# Patient Record
Sex: FEMALE | Race: WHITE | URBAN - METROPOLITAN AREA
[De-identification: names, ages, dates, MRNs, and addresses within clinical notes are randomized per-mention and may not be internally consistent; named-entity substitution may affect disease eponyms.]

---

## 2017-10-30 ENCOUNTER — APPOINTMENT (OUTPATIENT)
Dept: URBAN - METROPOLITAN AREA SURGERY 9 | Age: 82
Setting detail: DERMATOLOGY
End: 2017-10-30

## 2017-10-30 DIAGNOSIS — L72.0 EPIDERMAL CYST: ICD-10-CM

## 2017-10-30 PROBLEM — E13.9 OTHER SPECIFIED DIABETES MELLITUS WITHOUT COMPLICATIONS: Status: ACTIVE | Noted: 2017-10-30

## 2017-10-30 PROBLEM — M12.9 ARTHROPATHY, UNSPECIFIED: Status: ACTIVE | Noted: 2017-10-30

## 2017-10-30 PROBLEM — I10 ESSENTIAL (PRIMARY) HYPERTENSION: Status: ACTIVE | Noted: 2017-10-30

## 2017-10-30 PROBLEM — E78.5 HYPERLIPIDEMIA, UNSPECIFIED: Status: ACTIVE | Noted: 2017-10-30

## 2017-10-30 PROBLEM — D48.5 NEOPLASM OF UNCERTAIN BEHAVIOR OF SKIN: Status: ACTIVE | Noted: 2017-10-30

## 2017-10-30 PROCEDURE — 99202 OFFICE O/P NEW SF 15 MIN: CPT

## 2017-10-30 PROCEDURE — OTHER COUNSELING: OTHER

## 2017-10-30 PROCEDURE — OTHER CONSULTATION EXCISION: OTHER

## 2017-10-30 PROCEDURE — OTHER OTHER: OTHER

## 2017-10-30 ASSESSMENT — LOCATION DETAILED DESCRIPTION DERM: LOCATION DETAILED: RIGHT MEDIAL INFERIOR EYELID

## 2017-10-30 ASSESSMENT — LOCATION ZONE DERM: LOCATION ZONE: EYELID

## 2017-10-30 ASSESSMENT — LOCATION SIMPLE DESCRIPTION DERM: LOCATION SIMPLE: RIGHT INFERIOR EYELID

## 2017-10-30 NOTE — HPI: SKIN LESION
Has Your Skin Lesion Been Treated?: not been treated
Is This A New Presentation, Or A Follow-Up?: Skin Lesion
Additional History: Patient states it's a cyst.

## 2017-10-30 NOTE — PROCEDURE: OTHER
Other (Free Text): Recommended patient see [facility] () for evaluation and treatment. Patient prefers to see the [facility] surgeon first to see if they can remove it before going to another office for removal. Will discuss with the [facility] surgeons prior to scheduling excision appt. Recommend a  the day of the procedure since surgery will be so close to the eye Other (Free Text): Recommended patient see [facility] () for evaluation and treatment. Patient prefers to see the [facility] surgeon first to see if they can remove it before going to another office for removal. Will discuss with the [facility] surgeons prior to scheduling excision appt. Recommend a  the day of the procedure since surgery will be so close to the eye

## 2018-01-09 ENCOUNTER — APPOINTMENT (OUTPATIENT)
Dept: URBAN - METROPOLITAN AREA SURGERY 9 | Age: 83
Setting detail: DERMATOLOGY
End: 2018-01-11

## 2018-01-09 DIAGNOSIS — L72.8 OTHER FOLLICULAR CYSTS OF THE SKIN AND SUBCUTANEOUS TISSUE: ICD-10-CM

## 2018-01-09 PROCEDURE — OTHER CONSULTATION EXCISION: OTHER

## 2018-01-09 PROCEDURE — 99212 OFFICE O/P EST SF 10 MIN: CPT

## 2020-08-24 ENCOUNTER — APPOINTMENT (OUTPATIENT)
Dept: URBAN - METROPOLITAN AREA SURGERY 9 | Age: 85
Setting detail: DERMATOLOGY
End: 2020-08-25

## 2020-08-24 DIAGNOSIS — L72.8 OTHER FOLLICULAR CYSTS OF THE SKIN AND SUBCUTANEOUS TISSUE: ICD-10-CM

## 2020-08-24 PROCEDURE — 99213 OFFICE O/P EST LOW 20 MIN: CPT

## 2020-08-24 PROCEDURE — OTHER CONSULTATION EXCISION: OTHER

## 2020-09-23 ENCOUNTER — APPOINTMENT (OUTPATIENT)
Dept: URBAN - METROPOLITAN AREA SURGERY 9 | Age: 85
Setting detail: DERMATOLOGY
End: 2020-09-24

## 2020-09-23 DIAGNOSIS — L72.8 OTHER FOLLICULAR CYSTS OF THE SKIN AND SUBCUTANEOUS TISSUE: ICD-10-CM

## 2020-09-23 PROCEDURE — 12052 INTMD RPR FACE/MM 2.6-5.0 CM: CPT

## 2020-09-23 PROCEDURE — 11443 EXC FACE-MM B9+MARG 2.1-3 CM: CPT

## 2020-09-23 PROCEDURE — OTHER EXCISION: OTHER

## 2020-09-23 ASSESSMENT — LOCATION SIMPLE DESCRIPTION DERM: LOCATION SIMPLE: RIGHT CHEEK

## 2020-09-23 ASSESSMENT — LOCATION DETAILED DESCRIPTION DERM: LOCATION DETAILED: RIGHT SUPERIOR CENTRAL MALAR CHEEK

## 2020-09-23 ASSESSMENT — LOCATION ZONE DERM: LOCATION ZONE: FACE

## 2020-09-23 NOTE — PROCEDURE: EXCISION
Present, unchanged Complex Repair Preamble Text (Leave Blank If You Do Not Want): Extensive wide undermining was performed.

## 2020-11-09 ENCOUNTER — APPOINTMENT (OUTPATIENT)
Dept: URBAN - METROPOLITAN AREA SURGERY 9 | Age: 85
Setting detail: DERMATOLOGY
End: 2020-11-11

## 2020-11-09 DIAGNOSIS — Z48.817 ENCOUNTER FOR SURGICAL AFTERCARE FOLLOWING SURGERY ON THE SKIN AND SUBCUTANEOUS TISSUE: ICD-10-CM

## 2020-11-09 PROCEDURE — OTHER POST-OP WOUND CHECK: OTHER

## 2020-11-09 PROCEDURE — 99024 POSTOP FOLLOW-UP VISIT: CPT

## 2020-11-09 ASSESSMENT — LOCATION SIMPLE DESCRIPTION DERM: LOCATION SIMPLE: RIGHT CHEEK

## 2020-11-09 ASSESSMENT — LOCATION DETAILED DESCRIPTION DERM: LOCATION DETAILED: RIGHT CENTRAL MALAR CHEEK

## 2020-11-09 ASSESSMENT — LOCATION ZONE DERM: LOCATION ZONE: FACE

## 2020-11-09 NOTE — PROCEDURE: POST-OP WOUND CHECK
Body Location Override (Optional - Billing Will Still Be Based On Selected Body Map Location If Applicable): right lower eyelid
Detail Level: Detailed
Add 27083 Cpt? (Important Note: In 2017 The Use Of 40228 Is Being Tracked By Cms To Determine Future Global Period Reimbursement For Global Periods): yes
Wound Evaluated By: Dr. Lu

## 2023-07-21 LAB
GRAM STAIN: NORMAL
TISSUE/WOUND CULTURE/SMEAR: NORMAL

## 2024-02-02 ENCOUNTER — APPOINTMENT (OUTPATIENT)
Dept: CARDIOLOGY | Facility: HOSPITAL | Age: 89
DRG: 641 | End: 2024-02-02
Payer: MEDICARE

## 2024-02-02 ENCOUNTER — HOSPITAL ENCOUNTER (INPATIENT)
Facility: HOSPITAL | Age: 89
LOS: 1 days | Discharge: NURSING, REHAB OR LONG-TERM CARE | DRG: 641 | End: 2024-02-03
Attending: EMERGENCY MEDICINE | Admitting: INTERNAL MEDICINE
Payer: MEDICARE

## 2024-02-02 ENCOUNTER — APPOINTMENT (OUTPATIENT)
Dept: RADIOLOGY | Facility: HOSPITAL | Age: 89
DRG: 641 | End: 2024-02-02
Payer: MEDICARE

## 2024-02-02 DIAGNOSIS — I50.9 CONGESTIVE HEART FAILURE, UNSPECIFIED HF CHRONICITY, UNSPECIFIED HEART FAILURE TYPE (MULTI): ICD-10-CM

## 2024-02-02 DIAGNOSIS — L03.119 CELLULITIS OF LOWER EXTREMITY, UNSPECIFIED LATERALITY: ICD-10-CM

## 2024-02-02 DIAGNOSIS — R60.0 LEG EDEMA: Primary | ICD-10-CM

## 2024-02-02 DIAGNOSIS — R22.2 LOCALIZED SWELLING, MASS AND LUMP, TRUNK: ICD-10-CM

## 2024-02-02 DIAGNOSIS — R19.7 DIARRHEA, UNSPECIFIED TYPE: ICD-10-CM

## 2024-02-02 LAB
ALBUMIN SERPL BCP-MCNC: 3.8 G/DL (ref 3.4–5)
ALP SERPL-CCNC: 93 U/L (ref 33–136)
ALT SERPL W P-5'-P-CCNC: 10 U/L (ref 7–45)
ANION GAP SERPL CALC-SCNC: 12 MMOL/L (ref 10–20)
APPEARANCE UR: CLEAR
AST SERPL W P-5'-P-CCNC: 12 U/L (ref 9–39)
BACTERIA #/AREA URNS AUTO: ABNORMAL /HPF
BASOPHILS # BLD AUTO: 0.07 X10*3/UL (ref 0–0.1)
BASOPHILS NFR BLD AUTO: 0.7 %
BILIRUB SERPL-MCNC: 0.3 MG/DL (ref 0–1.2)
BILIRUB UR STRIP.AUTO-MCNC: NEGATIVE MG/DL
BNP SERPL-MCNC: 179 PG/ML (ref 0–99)
BUN SERPL-MCNC: 7 MG/DL (ref 6–23)
C DIF TOX TCDA+TCDB STL QL NAA+PROBE: NOT DETECTED
CALCIUM SERPL-MCNC: 9.1 MG/DL (ref 8.6–10.3)
CARDIAC TROPONIN I PNL SERPL HS: 4 NG/L (ref 0–13)
CHLORIDE SERPL-SCNC: 103 MMOL/L (ref 98–107)
CO2 SERPL-SCNC: 28 MMOL/L (ref 21–32)
COLOR UR: YELLOW
CREAT SERPL-MCNC: 0.37 MG/DL (ref 0.5–1.05)
EGFRCR SERPLBLD CKD-EPI 2021: >90 ML/MIN/1.73M*2
EOSINOPHIL # BLD AUTO: 0.07 X10*3/UL (ref 0–0.4)
EOSINOPHIL NFR BLD AUTO: 0.7 %
ERYTHROCYTE [DISTWIDTH] IN BLOOD BY AUTOMATED COUNT: 18.2 % (ref 11.5–14.5)
GLUCOSE SERPL-MCNC: 159 MG/DL (ref 74–99)
GLUCOSE UR STRIP.AUTO-MCNC: NEGATIVE MG/DL
HCT VFR BLD AUTO: 36.2 % (ref 36–46)
HGB BLD-MCNC: 10.9 G/DL (ref 12–16)
IMM GRANULOCYTES # BLD AUTO: 0.05 X10*3/UL (ref 0–0.5)
IMM GRANULOCYTES NFR BLD AUTO: 0.5 % (ref 0–0.9)
KETONES UR STRIP.AUTO-MCNC: NEGATIVE MG/DL
LEUKOCYTE ESTERASE UR QL STRIP.AUTO: ABNORMAL
LYMPHOCYTES # BLD AUTO: 1.37 X10*3/UL (ref 0.8–3)
LYMPHOCYTES NFR BLD AUTO: 14.6 %
MCH RBC QN AUTO: 24.2 PG (ref 26–34)
MCHC RBC AUTO-ENTMCNC: 30.1 G/DL (ref 32–36)
MCV RBC AUTO: 80 FL (ref 80–100)
MONOCYTES # BLD AUTO: 0.87 X10*3/UL (ref 0.05–0.8)
MONOCYTES NFR BLD AUTO: 9.3 %
NEUTROPHILS # BLD AUTO: 6.94 X10*3/UL (ref 1.6–5.5)
NEUTROPHILS NFR BLD AUTO: 74.2 %
NITRITE UR QL STRIP.AUTO: NEGATIVE
NRBC BLD-RTO: 0 /100 WBCS (ref 0–0)
PH UR STRIP.AUTO: 7 [PH]
PLATELET # BLD AUTO: 460 X10*3/UL (ref 150–450)
POTASSIUM SERPL-SCNC: 3.8 MMOL/L (ref 3.5–5.3)
PROT SERPL-MCNC: 6.9 G/DL (ref 6.4–8.2)
PROT UR STRIP.AUTO-MCNC: NEGATIVE MG/DL
RBC # BLD AUTO: 4.51 X10*6/UL (ref 4–5.2)
RBC # UR STRIP.AUTO: NEGATIVE /UL
RBC #/AREA URNS AUTO: ABNORMAL /HPF
SODIUM SERPL-SCNC: 139 MMOL/L (ref 136–145)
SP GR UR STRIP.AUTO: 1.01
UROBILINOGEN UR STRIP.AUTO-MCNC: <2 MG/DL
WBC # BLD AUTO: 9.4 X10*3/UL (ref 4.4–11.3)
WBC #/AREA URNS AUTO: ABNORMAL /HPF

## 2024-02-02 PROCEDURE — 84075 ASSAY ALKALINE PHOSPHATASE: CPT | Performed by: EMERGENCY MEDICINE

## 2024-02-02 PROCEDURE — 1100000001 HC PRIVATE ROOM DAILY

## 2024-02-02 PROCEDURE — 99223 1ST HOSP IP/OBS HIGH 75: CPT | Performed by: INTERNAL MEDICINE

## 2024-02-02 PROCEDURE — 2500000004 HC RX 250 GENERAL PHARMACY W/ HCPCS (ALT 636 FOR OP/ED): Performed by: EMERGENCY MEDICINE

## 2024-02-02 PROCEDURE — 93010 ELECTROCARDIOGRAM REPORT: CPT | Performed by: NURSE PRACTITIONER

## 2024-02-02 PROCEDURE — 81001 URINALYSIS AUTO W/SCOPE: CPT | Performed by: EMERGENCY MEDICINE

## 2024-02-02 PROCEDURE — 87506 IADNA-DNA/RNA PROBE TQ 6-11: CPT | Mod: SAMLAB | Performed by: EMERGENCY MEDICINE

## 2024-02-02 PROCEDURE — 36415 COLL VENOUS BLD VENIPUNCTURE: CPT | Performed by: EMERGENCY MEDICINE

## 2024-02-02 PROCEDURE — 83880 ASSAY OF NATRIURETIC PEPTIDE: CPT | Performed by: EMERGENCY MEDICINE

## 2024-02-02 PROCEDURE — 84484 ASSAY OF TROPONIN QUANT: CPT | Performed by: EMERGENCY MEDICINE

## 2024-02-02 PROCEDURE — 99285 EMERGENCY DEPT VISIT HI MDM: CPT | Performed by: EMERGENCY MEDICINE

## 2024-02-02 PROCEDURE — 87086 URINE CULTURE/COLONY COUNT: CPT | Mod: SAMLAB | Performed by: EMERGENCY MEDICINE

## 2024-02-02 PROCEDURE — 71045 X-RAY EXAM CHEST 1 VIEW: CPT

## 2024-02-02 PROCEDURE — 93005 ELECTROCARDIOGRAM TRACING: CPT

## 2024-02-02 PROCEDURE — 87493 C DIFF AMPLIFIED PROBE: CPT | Performed by: EMERGENCY MEDICINE

## 2024-02-02 PROCEDURE — 84460 ALANINE AMINO (ALT) (SGPT): CPT | Performed by: EMERGENCY MEDICINE

## 2024-02-02 PROCEDURE — 85025 COMPLETE CBC W/AUTO DIFF WBC: CPT | Performed by: EMERGENCY MEDICINE

## 2024-02-02 PROCEDURE — 71045 X-RAY EXAM CHEST 1 VIEW: CPT | Performed by: RADIOLOGY

## 2024-02-02 PROCEDURE — 96365 THER/PROPH/DIAG IV INF INIT: CPT

## 2024-02-02 RX ORDER — ACETAMINOPHEN 325 MG/1
650 TABLET ORAL EVERY 6 HOURS PRN
COMMUNITY

## 2024-02-02 RX ORDER — GABAPENTIN 300 MG/1
300 CAPSULE ORAL EVERY MORNING
COMMUNITY

## 2024-02-02 RX ORDER — METFORMIN HYDROCHLORIDE 500 MG/1
500 TABLET, EXTENDED RELEASE ORAL DAILY
COMMUNITY

## 2024-02-02 RX ORDER — BISMUTH SUBSALICYLATE 262 MG
1 TABLET,CHEWABLE ORAL DAILY
COMMUNITY

## 2024-02-02 RX ORDER — ESCITALOPRAM OXALATE 10 MG/1
10 TABLET ORAL EVERY MORNING
COMMUNITY

## 2024-02-02 RX ORDER — CEFTRIAXONE 2 G/50ML
2 INJECTION, SOLUTION INTRAVENOUS ONCE
Status: COMPLETED | OUTPATIENT
Start: 2024-02-02 | End: 2024-02-02

## 2024-02-02 RX ORDER — PANTOPRAZOLE SODIUM 40 MG/1
40 TABLET, DELAYED RELEASE ORAL DAILY
COMMUNITY

## 2024-02-02 RX ORDER — METOPROLOL TARTRATE 50 MG/1
50 TABLET ORAL 2 TIMES DAILY
COMMUNITY

## 2024-02-02 RX ORDER — GABAPENTIN 300 MG/1
600 CAPSULE ORAL NIGHTLY
COMMUNITY

## 2024-02-02 RX ORDER — CALCIUM CARBONATE 300MG(750)
400 TABLET,CHEWABLE ORAL 2 TIMES DAILY
COMMUNITY
End: 2024-02-29 | Stop reason: HOSPADM

## 2024-02-02 RX ORDER — ATORVASTATIN CALCIUM 20 MG/1
20 TABLET, FILM COATED ORAL NIGHTLY
COMMUNITY

## 2024-02-02 RX ORDER — AMLODIPINE BESYLATE 5 MG/1
5 TABLET ORAL DAILY
COMMUNITY
End: 2024-02-03 | Stop reason: HOSPADM

## 2024-02-02 RX ORDER — WARFARIN SODIUM 5 MG/1
5 TABLET ORAL DAILY
COMMUNITY
End: 2024-02-29 | Stop reason: HOSPADM

## 2024-02-02 RX ORDER — CYCLOBENZAPRINE HCL 10 MG
10 TABLET ORAL DAILY PRN
COMMUNITY

## 2024-02-02 RX ORDER — ACETAMINOPHEN 325 MG/1
650 TABLET ORAL EVERY 4 HOURS PRN
Status: DISCONTINUED | OUTPATIENT
Start: 2024-02-02 | End: 2024-02-03 | Stop reason: HOSPADM

## 2024-02-02 RX ORDER — MIRTAZAPINE 15 MG/1
15 TABLET, FILM COATED ORAL NIGHTLY
COMMUNITY

## 2024-02-02 RX ORDER — LOPERAMIDE HYDROCHLORIDE 2 MG/1
2 CAPSULE ORAL 4 TIMES DAILY PRN
COMMUNITY

## 2024-02-02 RX ADMIN — ACETAMINOPHEN 650 MG: 325 TABLET ORAL at 23:38

## 2024-02-02 RX ADMIN — CEFTRIAXONE SODIUM 2 G: 2 INJECTION, SOLUTION INTRAVENOUS at 19:15

## 2024-02-02 SDOH — SOCIAL STABILITY: SOCIAL INSECURITY: ABUSE: ADULT

## 2024-02-02 SDOH — SOCIAL STABILITY: SOCIAL INSECURITY: HAS ANYONE EVER THREATENED TO HURT YOUR FAMILY OR YOUR PETS?: NO

## 2024-02-02 SDOH — SOCIAL STABILITY: SOCIAL INSECURITY: HAVE YOU HAD THOUGHTS OF HARMING ANYONE ELSE?: NO

## 2024-02-02 SDOH — SOCIAL STABILITY: SOCIAL INSECURITY: DO YOU FEEL UNSAFE GOING BACK TO THE PLACE WHERE YOU ARE LIVING?: NO

## 2024-02-02 SDOH — SOCIAL STABILITY: SOCIAL INSECURITY: DO YOU FEEL ANYONE HAS EXPLOITED OR TAKEN ADVANTAGE OF YOU FINANCIALLY OR OF YOUR PERSONAL PROPERTY?: YES

## 2024-02-02 SDOH — SOCIAL STABILITY: SOCIAL INSECURITY: ARE YOU OR HAVE YOU BEEN THREATENED OR ABUSED PHYSICALLY, EMOTIONALLY, OR SEXUALLY BY ANYONE?: NO

## 2024-02-02 SDOH — SOCIAL STABILITY: SOCIAL INSECURITY: DOES ANYONE TRY TO KEEP YOU FROM HAVING/CONTACTING OTHER FRIENDS OR DOING THINGS OUTSIDE YOUR HOME?: NO

## 2024-02-02 SDOH — SOCIAL STABILITY: SOCIAL INSECURITY: ARE THERE ANY APPARENT SIGNS OF INJURIES/BEHAVIORS THAT COULD BE RELATED TO ABUSE/NEGLECT?: YES

## 2024-02-02 ASSESSMENT — LIFESTYLE VARIABLES
AUDIT-C TOTAL SCORE: 0
SKIP TO QUESTIONS 9-10: 1
SUBSTANCE_ABUSE_PAST_12_MONTHS: NO
HOW MANY STANDARD DRINKS CONTAINING ALCOHOL DO YOU HAVE ON A TYPICAL DAY: PATIENT DOES NOT DRINK
HOW OFTEN DO YOU HAVE A DRINK CONTAINING ALCOHOL: NEVER
AUDIT-C TOTAL SCORE: 0
PRESCIPTION_ABUSE_PAST_12_MONTHS: NO
HOW OFTEN DO YOU HAVE 6 OR MORE DRINKS ON ONE OCCASION: NEVER

## 2024-02-02 ASSESSMENT — ACTIVITIES OF DAILY LIVING (ADL)
HEARING - LEFT EAR: DIFFICULTY WITH NOISE
JUDGMENT_ADEQUATE_SAFELY_COMPLETE_DAILY_ACTIVITIES: YES
ADEQUATE_TO_COMPLETE_ADL: YES
HEARING - RIGHT EAR: DIFFICULTY WITH NOISE
FEEDING YOURSELF: INDEPENDENT
DRESSING YOURSELF: NEEDS ASSISTANCE
GROOMING: NEEDS ASSISTANCE
TOILETING: NEEDS ASSISTANCE
PATIENT'S MEMORY ADEQUATE TO SAFELY COMPLETE DAILY ACTIVITIES?: YES
BATHING: NEEDS ASSISTANCE
LACK_OF_TRANSPORTATION: NO
WALKS IN HOME: DEPENDENT

## 2024-02-02 ASSESSMENT — PAIN DESCRIPTION - LOCATION: LOCATION: HEAD

## 2024-02-02 ASSESSMENT — PAIN - FUNCTIONAL ASSESSMENT
PAIN_FUNCTIONAL_ASSESSMENT: 0-10

## 2024-02-02 ASSESSMENT — PATIENT HEALTH QUESTIONNAIRE - PHQ9
2. FEELING DOWN, DEPRESSED OR HOPELESS: NOT AT ALL
1. LITTLE INTEREST OR PLEASURE IN DOING THINGS: NOT AT ALL
SUM OF ALL RESPONSES TO PHQ9 QUESTIONS 1 & 2: 0

## 2024-02-02 ASSESSMENT — COLUMBIA-SUICIDE SEVERITY RATING SCALE - C-SSRS
6. HAVE YOU EVER DONE ANYTHING, STARTED TO DO ANYTHING, OR PREPARED TO DO ANYTHING TO END YOUR LIFE?: NO
2. HAVE YOU ACTUALLY HAD ANY THOUGHTS OF KILLING YOURSELF?: NO
1. IN THE PAST MONTH, HAVE YOU WISHED YOU WERE DEAD OR WISHED YOU COULD GO TO SLEEP AND NOT WAKE UP?: NO

## 2024-02-02 ASSESSMENT — PAIN SCALES - GENERAL
PAINLEVEL_OUTOF10: 0 - NO PAIN
PAINLEVEL_OUTOF10: 9
PAINLEVEL_OUTOF10: 0 - NO PAIN

## 2024-02-02 ASSESSMENT — COGNITIVE AND FUNCTIONAL STATUS - GENERAL: PATIENT BASELINE BEDBOUND: YES

## 2024-02-02 NOTE — ED PROVIDER NOTES
HPI   Chief Complaint   Patient presents with    Leg Swelling     Patient arrives to the ER with complaint of LE edema bilaterally, seepage of serous fluid from the lower legs, patient has 3-4 + pitting edema. Further patient has watery diarrhea no going on 2 weeks and Imodium is not stopping the diarrhea. Pain in LE when she stands and is not able to walk,       Limitations to History: None    HPI: 88-year-old female presents with concern for diarrhea for the past 3 weeks.  States it has been loose in nature.  States that she also has swelling to her bilateral lower extremities.  Denies any chest pain, shortness of breath, nausea, vomiting, fever, chills, urinary symptoms.    Additional History Obtained from: EMS    ------------------------------------------------------------------------------------------------------------------------------------------  Physical Exam:    VS: As documented in the triage note and EMR flowsheet from this visit were reviewed.    Appearance: Alert. cooperative,  in no acute distress.   Skin: Erythematous bilateral lower extremities.  Violaceous purpura.   HENT: Normocephalic, atraumatic. Nares patent. No intraoral lesions.   Neck: Supple, without meningismus. Trachea at midline. No lymphadenopathy.  Pulmonary: Clear bilaterally with good chest wall excursion. No rales, rhonchi or wheezing. No accessory muscle use or stridor.  Cardiac: Regular rate and rhythm, no rubs, murmurs, or gallops.   Abdomen: Abdomen is soft, nontender, and nondistended.  No palpable organomegaly.  No rebound or guarding.  No CVA tenderness. Nonsurgical abdomen  Genitourinary: Exam deferred.  Musculoskeletal: Full range of motion.  Pulses full and equal. No cyanosis, clubbing, or edema.  Psychiatric: Appropriate mood and affect.                            No data recorded                Patient History   No past medical history on file.  No past surgical history on file.  No family history on file.  Social  History     Tobacco Use    Smoking status: Not on file    Smokeless tobacco: Not on file   Substance Use Topics    Alcohol use: Not on file    Drug use: Not on file       Physical Exam   ED Triage Vitals   Temp Pulse Resp BP   -- -- -- --      SpO2 Temp src Heart Rate Source Patient Position   -- -- -- --      BP Location FiO2 (%)     -- --       Physical Exam    ED Course & MDM   Diagnoses as of 02/02/24 1844   Leg edema   Diarrhea, unspecified type   Cellulitis of lower extremity, unspecified laterality       Medical Decision Making  Labs Reviewed  CBC WITH AUTO DIFFERENTIAL - Abnormal     WBC                           9.4                    nRBC                          0.0                    RBC                           4.51                   Hemoglobin                    10.9 (*)               Hematocrit                    36.2                   MCV                           80                     MCH                           24.2 (*)               MCHC                          30.1 (*)               RDW                           18.2 (*)               Platelets                     460 (*)                Neutrophils %                 74.2                   Immature Granulocytes %, Automated   0.5                    Lymphocytes %                 14.6                   Monocytes %                   9.3                    Eosinophils %                 0.7                    Basophils %                   0.7                    Neutrophils Absolute          6.94 (*)               Immature Granulocytes Absolute, Au*   0.05                   Lymphocytes Absolute          1.37                   Monocytes Absolute            0.87 (*)               Eosinophils Absolute          0.07                   Basophils Absolute            0.07                COMPREHENSIVE METABOLIC PANEL - Abnormal     Glucose                       159 (*)                Sodium                        139                    Potassium                      3.8                    Chloride                      103                    Bicarbonate                   28                     Anion Gap                     12                     Urea Nitrogen                 7                      Creatinine                    0.37 (*)               eGFR                          >90                    Calcium                       9.1                    Albumin                       3.8                    Alkaline Phosphatase          93                     Total Protein                 6.9                    AST                           12                     Bilirubin, Total              0.3                    ALT                           10                  B-TYPE NATRIURETIC PEPTIDE - Abnormal     BNP                           179 (*)                    Narrative:    <100 pg/mL - Heart failure unlikely                  100-299 pg/mL - Intermediate probability of acute heart                                  failure exacerbation. Correlate with clinical                                  context and patient history.                    >=300 pg/mL - Heart Failure likely. Correlate with clinical                                  context and patient history.                                    BNP testing is performed using different testing methodology at [facility] than at other [facility]. Direct result comparisons should only be made within the same method.                     C. DIFFICILE, PCR - Normal     C. difficile, PCR                                        Narrative: This test is an FDA-cleared real-time PCR assay for detection of toxigenic C. difficile DNA from unprocessed liquid or unformed stool specimens that have not undergone nucleic acid extraction in symptomatic patients with potential C. difficile infection (CDI). A positive result may indicate colonization, and clinical assessment is required for the diagnosis of CDI. This test  cannot be performed on formed stools or used as a test of cure, and should not be performed more than once per 7 days.  TROPONIN I, HIGH SENSITIVITY - Normal     Troponin I, High Sensitivity   4                          Narrative: Less than 99th percentile of normal range cutoff-                  Female and children under 18 years old <14 ng/L; Male <21 ng/L: Negative                  Repeat testing should be performed if clinically indicated.                                     Female and children under 18 years old 14-50 ng/L; Male 21-50 ng/L:                  Consistent with possible cardiac damage and possible increased clinical                   risk. Serial measurements may help to assess extent of myocardial damage.                                     >50 ng/L: Consistent with cardiac damage, increased clinical risk and                  myocardial infarction. Serial measurements may help assess extent of                   myocardial damage.                                      NOTE: Children less than 1 year old may have higher baseline troponin                   levels and results should be interpreted in conjunction with the overall                   clinical context.                                     NOTE: Troponin I testing is performed using a different                   testing methodology at [facility] than at other                   [facility]. Direct result comparisons should only                   be made within the same method.  STOOL PATHOGEN PANEL, PCR  URINALYSIS WITH REFLEX CULTURE AND MICROSCOPIC         Narrative: The following orders were created for panel order Urinalysis with Reflex Culture and Microscopic.                  Procedure                               Abnormality         Status                                     ---------                               -----------         ------                                     Urinalysis with Reflex C...[[id number]]                                                                  Extra Urine Gray Tube[[id number]]                                                                                         Please view results for these tests on the individual orders.  URINALYSIS WITH REFLEX CULTURE AND MICROSCOPIC  EXTRA URINE GRAY TUBE  XR chest 1 view   Final Result    1.  Mild cardiomegaly. No acute pulmonary process          Signed by: Reggie Freedman 2/2/2024 5:49 PM    Dictation workstation:   [id number]     Medical Decision Making:    Patient appears well nontoxic.  Vital signs show hypertension but otherwise within normal limits.  Lab work unremarkable.  C. difficile negative.  Stool sent for culture.  Patient's lower extremities show edema well as well as extensive cellulitis.  Patient treated with Rocephin.  Will be admitted for further treatment and evaluation.    Differential Diagnoses Considered: Cellulitis, edema, CHF, electrolyte abnormality, infectious diarrhea    Independent Interpretation of Studies:  I independently interpreted: Chest x-ray shows no evidence of pneumothorax or pneumonia.    Escalation of Care:  Appropriate for admission for further treatment and evaluation.    Discussion of Management with Other Providers:   I discussed the patient/results with: Admitting hospitalist.            Procedure  ECG 12 lead    Performed by: Gigi Núñez DO  Authorized by: Gigi Núñez DO    ECG interpreted by ED Physician in the absence of a cardiologist: yes    Comments:      EKG interpreted by Dr. Gigi Núñez: Normal sinus rhythm at 61 bpm.  DC interval 174 ms.  QTc 442 ms.  No evidence of ST elevation or depression at this time.       Gigi Núñez DO  02/02/24 1941

## 2024-02-03 VITALS
WEIGHT: 166.67 LBS | TEMPERATURE: 97.3 F | SYSTOLIC BLOOD PRESSURE: 170 MMHG | BODY MASS INDEX: 26.79 KG/M2 | OXYGEN SATURATION: 90 % | HEART RATE: 65 BPM | RESPIRATION RATE: 20 BRPM | DIASTOLIC BLOOD PRESSURE: 75 MMHG | HEIGHT: 66 IN

## 2024-02-03 LAB
ANION GAP SERPL CALC-SCNC: 14 MMOL/L (ref 10–20)
BUN SERPL-MCNC: 5 MG/DL (ref 6–23)
C COLI+JEJ+UPSA DNA STL QL NAA+PROBE: NOT DETECTED
CALCIUM SERPL-MCNC: 8.8 MG/DL (ref 8.6–10.3)
CHLORIDE SERPL-SCNC: 100 MMOL/L (ref 98–107)
CO2 SERPL-SCNC: 29 MMOL/L (ref 21–32)
CREAT SERPL-MCNC: 0.35 MG/DL (ref 0.5–1.05)
EC STX1 GENE STL QL NAA+PROBE: NOT DETECTED
EC STX2 GENE STL QL NAA+PROBE: NOT DETECTED
EGFRCR SERPLBLD CKD-EPI 2021: >90 ML/MIN/1.73M*2
ERYTHROCYTE [DISTWIDTH] IN BLOOD BY AUTOMATED COUNT: 18 % (ref 11.5–14.5)
FERRITIN SERPL-MCNC: 23 NG/ML (ref 8–150)
FOLATE SERPL-MCNC: 16.6 NG/ML
GLUCOSE BLD MANUAL STRIP-MCNC: 113 MG/DL (ref 74–99)
GLUCOSE SERPL-MCNC: 119 MG/DL (ref 74–99)
HCT VFR BLD AUTO: 34.9 % (ref 36–46)
HGB BLD-MCNC: 10.2 G/DL (ref 12–16)
HGB RETIC QN: 26 PG (ref 28–38)
HOLD SPECIMEN: NORMAL
IMMATURE RETIC FRACTION: 31.3 %
INR PPP: 3.7 (ref 0.9–1.1)
IRON SATN MFR SERPL: 7 % (ref 25–45)
IRON SERPL-MCNC: 16 UG/DL (ref 35–150)
MCH RBC QN AUTO: 23.6 PG (ref 26–34)
MCHC RBC AUTO-ENTMCNC: 29.2 G/DL (ref 32–36)
MCV RBC AUTO: 81 FL (ref 80–100)
NOROVIRUS GI + GII RNA STL NAA+PROBE: NOT DETECTED
NRBC BLD-RTO: 0 /100 WBCS (ref 0–0)
PLATELET # BLD AUTO: 434 X10*3/UL (ref 150–450)
POTASSIUM SERPL-SCNC: 3.6 MMOL/L (ref 3.5–5.3)
PROTHROMBIN TIME: 42.2 SECONDS (ref 9.8–12.8)
RBC # BLD AUTO: 4.32 X10*6/UL (ref 4–5.2)
RETICS #: 0.06 X10*6/UL (ref 0.02–0.11)
RETICS/RBC NFR AUTO: 1.4 % (ref 0.5–2)
RV RNA STL NAA+PROBE: NOT DETECTED
SALMONELLA DNA STL QL NAA+PROBE: NOT DETECTED
SHIGELLA DNA SPEC QL NAA+PROBE: NOT DETECTED
SODIUM SERPL-SCNC: 139 MMOL/L (ref 136–145)
TIBC SERPL-MCNC: 236 UG/DL (ref 240–445)
TSH SERPL-ACNC: 2 MIU/L (ref 0.44–3.98)
UIBC SERPL-MCNC: 220 UG/DL (ref 110–370)
V CHOLERAE DNA STL QL NAA+PROBE: NOT DETECTED
VIT B12 SERPL-MCNC: 300 PG/ML (ref 211–911)
WBC # BLD AUTO: 7.7 X10*3/UL (ref 4.4–11.3)
Y ENTEROCOL DNA STL QL NAA+PROBE: NOT DETECTED

## 2024-02-03 PROCEDURE — 99238 HOSP IP/OBS DSCHRG MGMT 30/<: CPT | Performed by: INTERNAL MEDICINE

## 2024-02-03 PROCEDURE — 87070 CULTURE OTHR SPECIMN AEROBIC: CPT | Mod: SAMLAB | Performed by: INTERNAL MEDICINE

## 2024-02-03 PROCEDURE — 84443 ASSAY THYROID STIM HORMONE: CPT | Performed by: INTERNAL MEDICINE

## 2024-02-03 PROCEDURE — 82947 ASSAY GLUCOSE BLOOD QUANT: CPT

## 2024-02-03 PROCEDURE — 85045 AUTOMATED RETICULOCYTE COUNT: CPT | Performed by: INTERNAL MEDICINE

## 2024-02-03 PROCEDURE — 83540 ASSAY OF IRON: CPT | Performed by: INTERNAL MEDICINE

## 2024-02-03 PROCEDURE — 82746 ASSAY OF FOLIC ACID SERUM: CPT | Performed by: INTERNAL MEDICINE

## 2024-02-03 PROCEDURE — 2500000004 HC RX 250 GENERAL PHARMACY W/ HCPCS (ALT 636 FOR OP/ED): Performed by: INTERNAL MEDICINE

## 2024-02-03 PROCEDURE — 82607 VITAMIN B-12: CPT | Performed by: INTERNAL MEDICINE

## 2024-02-03 PROCEDURE — 85610 PROTHROMBIN TIME: CPT | Performed by: INTERNAL MEDICINE

## 2024-02-03 PROCEDURE — 36415 COLL VENOUS BLD VENIPUNCTURE: CPT | Performed by: INTERNAL MEDICINE

## 2024-02-03 PROCEDURE — 82728 ASSAY OF FERRITIN: CPT | Performed by: INTERNAL MEDICINE

## 2024-02-03 PROCEDURE — 80048 BASIC METABOLIC PNL TOTAL CA: CPT | Performed by: INTERNAL MEDICINE

## 2024-02-03 PROCEDURE — 85027 COMPLETE CBC AUTOMATED: CPT | Performed by: INTERNAL MEDICINE

## 2024-02-03 PROCEDURE — 2500000001 HC RX 250 WO HCPCS SELF ADMINISTERED DRUGS (ALT 637 FOR MEDICARE OP): Performed by: INTERNAL MEDICINE

## 2024-02-03 RX ORDER — ONDANSETRON HYDROCHLORIDE 2 MG/ML
4 INJECTION, SOLUTION INTRAVENOUS EVERY 8 HOURS PRN
Status: DISCONTINUED | OUTPATIENT
Start: 2024-02-03 | End: 2024-02-03 | Stop reason: HOSPADM

## 2024-02-03 RX ORDER — GABAPENTIN 300 MG/1
300 CAPSULE ORAL EVERY MORNING
Status: DISCONTINUED | OUTPATIENT
Start: 2024-02-03 | End: 2024-02-03 | Stop reason: HOSPADM

## 2024-02-03 RX ORDER — MIRTAZAPINE 15 MG/1
15 TABLET, FILM COATED ORAL NIGHTLY
Status: DISCONTINUED | OUTPATIENT
Start: 2024-02-03 | End: 2024-02-03 | Stop reason: HOSPADM

## 2024-02-03 RX ORDER — PANTOPRAZOLE SODIUM 40 MG/1
40 TABLET, DELAYED RELEASE ORAL DAILY
Status: DISCONTINUED | OUTPATIENT
Start: 2024-02-03 | End: 2024-02-03 | Stop reason: HOSPADM

## 2024-02-03 RX ORDER — ONDANSETRON 4 MG/1
4 TABLET, ORALLY DISINTEGRATING ORAL EVERY 8 HOURS PRN
Status: DISCONTINUED | OUTPATIENT
Start: 2024-02-03 | End: 2024-02-03 | Stop reason: HOSPADM

## 2024-02-03 RX ORDER — MAGNESIUM HYDROXIDE 2400 MG/10ML
10 SUSPENSION ORAL DAILY PRN
Status: DISCONTINUED | OUTPATIENT
Start: 2024-02-03 | End: 2024-02-03 | Stop reason: HOSPADM

## 2024-02-03 RX ORDER — DEXTROSE 50 % IN WATER (D50W) INTRAVENOUS SYRINGE
25
Status: DISCONTINUED | OUTPATIENT
Start: 2024-02-03 | End: 2024-02-03 | Stop reason: HOSPADM

## 2024-02-03 RX ORDER — CEFTRIAXONE 1 G/50ML
1 INJECTION, SOLUTION INTRAVENOUS EVERY 24 HOURS
Status: DISCONTINUED | OUTPATIENT
Start: 2024-02-03 | End: 2024-02-03 | Stop reason: HOSPADM

## 2024-02-03 RX ORDER — ENOXAPARIN SODIUM 100 MG/ML
40 INJECTION SUBCUTANEOUS EVERY 24 HOURS
Status: DISCONTINUED | OUTPATIENT
Start: 2024-02-03 | End: 2024-02-03

## 2024-02-03 RX ORDER — DEXTROSE MONOHYDRATE 100 MG/ML
0.3 INJECTION, SOLUTION INTRAVENOUS ONCE AS NEEDED
Status: DISCONTINUED | OUTPATIENT
Start: 2024-02-03 | End: 2024-02-03 | Stop reason: HOSPADM

## 2024-02-03 RX ORDER — METOPROLOL TARTRATE 50 MG/1
50 TABLET ORAL 2 TIMES DAILY
Status: DISCONTINUED | OUTPATIENT
Start: 2024-02-03 | End: 2024-02-03 | Stop reason: HOSPADM

## 2024-02-03 RX ORDER — AMLODIPINE BESYLATE 5 MG/1
5 TABLET ORAL DAILY
Status: DISCONTINUED | OUTPATIENT
Start: 2024-02-03 | End: 2024-02-03 | Stop reason: HOSPADM

## 2024-02-03 RX ORDER — ACETAMINOPHEN 160 MG/5ML
650 SOLUTION ORAL EVERY 4 HOURS PRN
Status: DISCONTINUED | OUTPATIENT
Start: 2024-02-03 | End: 2024-02-03 | Stop reason: HOSPADM

## 2024-02-03 RX ORDER — ESCITALOPRAM OXALATE 10 MG/1
10 TABLET ORAL EVERY MORNING
Status: DISCONTINUED | OUTPATIENT
Start: 2024-02-03 | End: 2024-02-03 | Stop reason: HOSPADM

## 2024-02-03 RX ORDER — ACETAMINOPHEN 325 MG/1
650 TABLET ORAL EVERY 4 HOURS PRN
Status: DISCONTINUED | OUTPATIENT
Start: 2024-02-03 | End: 2024-02-03 | Stop reason: HOSPADM

## 2024-02-03 RX ORDER — WARFARIN SODIUM 5 MG/1
5 TABLET ORAL DAILY
Status: DISCONTINUED | OUTPATIENT
Start: 2024-02-03 | End: 2024-02-03 | Stop reason: HOSPADM

## 2024-02-03 RX ORDER — ACETAMINOPHEN 650 MG/1
650 SUPPOSITORY RECTAL EVERY 4 HOURS PRN
Status: DISCONTINUED | OUTPATIENT
Start: 2024-02-03 | End: 2024-02-03 | Stop reason: HOSPADM

## 2024-02-03 RX ORDER — FUROSEMIDE 40 MG/1
40 TABLET ORAL DAILY
Qty: 30 TABLET | Refills: 0 | Status: SHIPPED | OUTPATIENT
Start: 2024-02-03 | End: 2024-03-04

## 2024-02-03 RX ORDER — ATORVASTATIN CALCIUM 20 MG/1
20 TABLET, FILM COATED ORAL NIGHTLY
Status: DISCONTINUED | OUTPATIENT
Start: 2024-02-03 | End: 2024-02-03 | Stop reason: HOSPADM

## 2024-02-03 RX ORDER — GABAPENTIN 300 MG/1
600 CAPSULE ORAL NIGHTLY
Status: DISCONTINUED | OUTPATIENT
Start: 2024-02-03 | End: 2024-02-03 | Stop reason: HOSPADM

## 2024-02-03 RX ORDER — FUROSEMIDE 10 MG/ML
20 INJECTION INTRAMUSCULAR; INTRAVENOUS EVERY 8 HOURS SCHEDULED
Status: DISCONTINUED | OUTPATIENT
Start: 2024-02-03 | End: 2024-02-03 | Stop reason: HOSPADM

## 2024-02-03 RX ADMIN — GABAPENTIN 600 MG: 300 CAPSULE ORAL at 01:56

## 2024-02-03 RX ADMIN — AMLODIPINE BESYLATE 5 MG: 5 TABLET ORAL at 08:43

## 2024-02-03 RX ADMIN — ESCITALOPRAM OXALATE 10 MG: 10 TABLET ORAL at 08:43

## 2024-02-03 RX ADMIN — FUROSEMIDE 20 MG: 10 INJECTION, SOLUTION INTRAMUSCULAR; INTRAVENOUS at 05:08

## 2024-02-03 RX ADMIN — METOPROLOL TARTRATE 50 MG: 50 TABLET, FILM COATED ORAL at 10:10

## 2024-02-03 RX ADMIN — MIRTAZAPINE 15 MG: 15 TABLET, FILM COATED ORAL at 01:56

## 2024-02-03 RX ADMIN — METOPROLOL TARTRATE 50 MG: 50 TABLET, FILM COATED ORAL at 01:56

## 2024-02-03 RX ADMIN — PANTOPRAZOLE SODIUM 40 MG: 40 TABLET, DELAYED RELEASE ORAL at 08:43

## 2024-02-03 RX ADMIN — GABAPENTIN 300 MG: 300 CAPSULE ORAL at 10:09

## 2024-02-03 RX ADMIN — ATORVASTATIN CALCIUM 20 MG: 20 TABLET, FILM COATED ORAL at 01:56

## 2024-02-03 ASSESSMENT — COGNITIVE AND FUNCTIONAL STATUS - GENERAL
STANDING UP FROM CHAIR USING ARMS: A LITTLE
TOILETING: A LITTLE
HELP NEEDED FOR BATHING: A LITTLE
DAILY ACTIVITIY SCORE: 22
MOVING TO AND FROM BED TO CHAIR: A LITTLE
CLIMB 3 TO 5 STEPS WITH RAILING: A LOT
MOBILITY SCORE: 18
WALKING IN HOSPITAL ROOM: A LOT

## 2024-02-03 ASSESSMENT — PAIN SCALES - GENERAL
PAINLEVEL_OUTOF10: 0 - NO PAIN
PAINLEVEL_OUTOF10: 0 - NO PAIN

## 2024-02-03 ASSESSMENT — PAIN - FUNCTIONAL ASSESSMENT: PAIN_FUNCTIONAL_ASSESSMENT: 0-10

## 2024-02-03 NOTE — PROGRESS NOTES
02/03/24 0900   Discharge Planning   Living Arrangements Other (Comment)   Support Systems Other (Comment)   Assistance Needed return to [facility]   Type of Residence Assisted living   Do you have animals or pets at home? No   Care Facility Name [facility]   Who is requesting discharge planning? Provider       had asked if patient could return to [facility] today. SW did call [facility] and they are able to accept her back.LALO met with patient and introduced self and explained the role of CT in discharge planning.  Explained that we are looking for her to return to the [facility] and she was in agreement. LALO also called patients TANI Hemphill and advised her that patient will be returning to the [facility] today. She was in agreement and asked for the nurse to call her with update. Did ask nurse to do so. Patient will return to [facility] today. Transportation to be set up by unit secretary.

## 2024-02-03 NOTE — DISCHARGE SUMMARY
Discharge Diagnosis  Peripheral edema    Issues Requiring Follow-Up  Continued monitoring with her peripheral edema including addition of Lasix and also Ace wraps to legs bilaterally  Needs to have an echo set up as an outpatient      Test Results Pending At Discharge  Pending Labs       Order Current Status    Stool Pathogen Panel, PCR In process    Tissue/Wound Culture/Smear In process    Urine Culture In process            Hospital Course   She presented to the emergency room due to new onset of peripheral edema with volume overload  She was diuresed and her legs were wrapped and her legs are looking much better already  She would like to go back home she currently resides at the [facility] here in [place].  I will send her home Lasix  She should continue Ace wraps to legs bilaterally  She should also have close follow-up with a primary care physician and an echo as an outpatient  She states that she can get all of this done as an outpatient and really does not want to stay here in the hospital  At this time she is stable for discharge    Pertinent Physical Exam At Time of Discharge  Physical Exam  Constitutional:       Appearance: Normal appearance.   HENT:      Head: Normocephalic and atraumatic.      Right Ear: External ear normal.      Left Ear: External ear normal.      Nose: Nose normal.      Mouth/Throat:      Mouth: Mucous membranes are moist.      Pharynx: Oropharynx is clear.   Eyes:      Extraocular Movements: Extraocular movements intact.      Conjunctiva/sclera: Conjunctivae normal.      Pupils: Pupils are equal, round, and reactive to light.   Cardiovascular:      Rate and Rhythm: Normal rate and regular rhythm.   Pulmonary:      Effort: Pulmonary effort is normal.      Breath sounds: Normal breath sounds.   Abdominal:      General: Abdomen is flat.      Palpations: Abdomen is soft.   Musculoskeletal:         General: Swelling present.      Right lower leg: Edema present.      Left lower leg: Edema  present.      Comments: Ace wraps to her knees bilaterally and swelling is much improved  She did have some skin breakdown from the swelling and this will need to be watched as an outpatient as well   Skin:     General: Skin is warm and dry.   Neurological:      General: No focal deficit present.      Mental Status: She is alert and oriented to person, place, and time.   Psychiatric:         Mood and Affect: Mood normal.         Behavior: Behavior normal.         Outpatient Follow-Up  No future appointments.    Yair Benavidez, DO

## 2024-02-03 NOTE — H&P
History Of Present Illness  Mayte Pulliam is a 88 y.o. female  Who presented to the emergency room for lower extremities edema with seepage as well as diarrhea.  On presentation, vital signs grossly within normal limits.  Pertinent findings on blood workup; hemoglobin 10.9, glucose 159 and .  Urinalysis showed some trace leukocyte esterase.  Chest x-ray showed a mild cardiomegaly.  Patient was given in the emergency room 2 g IV ceftriaxone and then admitted to the medical service for further investigation and management.  Patient intubated history to around 2 weeks ago when she started noticing progressively worsening edema in the lower extremities.  This was not associated with any shortness of breath or palpitations or chest pain.  Never had this before.  Did not have any fever or chills.  The edema continued to worsen and the past several days she started having blisters with skin breaks.  Also the legs became tender to touch.  So patient came to the emergency room.     ROS  10 systems were reviewed and were negative except for those noted in the history of present illness.    Past Medical History  Past Medical History:   Diagnosis Date    Anxiety     COPD (chronic obstructive pulmonary disease) (CMS/HCC)     COVID-19     Depression     Depression     Diabetes mellitus (CMS/HCC)     GERD (gastroesophageal reflux disease)     Hyperlipidemia     Hypertension     Hypomagnesemia     Malnutrition (CMS/HCC)     Osteoarthritis      Pertinent medical history also documented in my below narrative    Surgical History  History reviewed. No pertinent surgical history.   Pertinent surgical history also documented in my below narrative    Social History  She reports that she has never smoked. She has never used smokeless tobacco. She reports that she does not currently use alcohol. She reports that she does not use drugs.    Family History  No family history on file.     Allergies  Patient has no known  allergies.    Medications Prior to Admission   Medication Sig Dispense Refill Last Dose    acetaminophen (Tylenol) 325 mg tablet Take 2 tablets (650 mg) by mouth every 6 hours if needed for mild pain (1 - 3).   2/1/2024    amLODIPine (Norvasc) 5 mg tablet Take 1 tablet (5 mg) by mouth once daily.   2/2/2024 at 0700    atorvastatin (Lipitor) 20 mg tablet Take 1 tablet (20 mg) by mouth once daily at bedtime.   2/1/2024 at 2000    cyclobenzaprine (Flexeril) 10 mg tablet Take 1 tablet (10 mg) by mouth once daily as needed for muscle spasms.   Unknown    escitalopram (Lexapro) 10 mg tablet Take 1 tablet (10 mg) by mouth once daily in the morning.   2/2/2024 at 0700    gabapentin (Neurontin) 300 mg capsule Take 1 capsule (300 mg) by mouth once daily in the morning.   2/2/2024 at 0700    gabapentin (Neurontin) 300 mg capsule Take 2 capsules (600 mg) by mouth once daily at bedtime.   2/1/2024 at 2000    loperamide (Imodium) 2 mg capsule Take 1 capsule (2 mg) by mouth 4 times a day as needed for diarrhea.   2/2/2024    magnesium oxide (Mag-Ox) 400 mg tablet 1 tablet (400 mg) 2 times a day.   2/2/2024 at 0700    metFORMIN  mg 24 hr tablet Take 1 tablet (500 mg) by mouth once daily. Do not crush, chew, or split.   2/2/2024 at 0700    metoprolol tartrate (Lopressor) 50 mg tablet Take 1 tablet by mouth 2 times a day.   2/2/2024 at 0700    mirtazapine (Remeron) 15 mg tablet Take 1 tablet (15 mg) by mouth once daily at bedtime.   2/1/2024 at 2000    multivitamin tablet Take 1 tablet by mouth once daily.   2/2/2024 at 0700    pantoprazole (ProtoNix) 40 mg EC tablet Take 1 tablet (40 mg) by mouth once daily. Do not crush, chew, or split.   2/2/2024 at 0700    warfarin (Coumadin) 5 mg tablet Take 1 tablet (5 mg) by mouth once daily. Take as directed per After Visit Summary.   2/1/2024 at 1700        Last Recorded Vitals  Blood pressure 136/71, pulse 67, temperature 36.2 °C (97.2 °F), temperature source Temporal, resp. rate 16,  "height 1.664 m (5' 5.5\"), weight 75.6 kg (166 lb 10.7 oz), SpO2 94 %.    Physical Exam  Constitutional:       General: She is not in acute distress.     Appearance: She is not ill-appearing.      Comments: Awake alert and oriented x3   HENT:      Ears:      Comments: Hearing loss     Mouth/Throat:      Pharynx: Oropharynx is clear.   Eyes:      Pupils: Pupils are equal, round, and reactive to light.   Cardiovascular:      Rate and Rhythm: Normal rate and regular rhythm.      Comments: There is a grade 3 systolic ejection murmur along the right second intercostal and the apical area  Pulmonary:      Effort: No respiratory distress.      Breath sounds: Normal breath sounds. No wheezing or rhonchi.   Abdominal:      General: Abdomen is flat. Bowel sounds are normal. There is no distension.      Palpations: Abdomen is soft.      Tenderness: There is no abdominal tenderness.   Musculoskeletal:      Comments: There is a severe +4 pitting edema in both legs.  I did not appreciate any in the feet.  There are multiple blisters spread along the legs with chronic venous stasis changes.  Skin breakage noted.  With yellow exudate discharge   Neurological:      General: No focal deficit present.   Psychiatric:         Mood and Affect: Mood normal.         Behavior: Behavior normal.         Thought Content: Thought content normal.         Judgment: Judgment normal.      Comments: Looks very anxious           Relevant Results  Results for orders placed or performed during the hospital encounter of 02/02/24 (from the past 24 hour(s))   CBC and Auto Differential   Result Value Ref Range    WBC 9.4 4.4 - 11.3 x10*3/uL    nRBC 0.0 0.0 - 0.0 /100 WBCs    RBC 4.51 4.00 - 5.20 x10*6/uL    Hemoglobin 10.9 (L) 12.0 - 16.0 g/dL    Hematocrit 36.2 36.0 - 46.0 %    MCV 80 80 - 100 fL    MCH 24.2 (L) 26.0 - 34.0 pg    MCHC 30.1 (L) 32.0 - 36.0 g/dL    RDW 18.2 (H) 11.5 - 14.5 %    Platelets 460 (H) 150 - 450 x10*3/uL    Neutrophils % 74.2 40.0 " - 80.0 %    Immature Granulocytes %, Automated 0.5 0.0 - 0.9 %    Lymphocytes % 14.6 13.0 - 44.0 %    Monocytes % 9.3 2.0 - 10.0 %    Eosinophils % 0.7 0.0 - 6.0 %    Basophils % 0.7 0.0 - 2.0 %    Neutrophils Absolute 6.94 (H) 1.60 - 5.50 x10*3/uL    Immature Granulocytes Absolute, Automated 0.05 0.00 - 0.50 x10*3/uL    Lymphocytes Absolute 1.37 0.80 - 3.00 x10*3/uL    Monocytes Absolute 0.87 (H) 0.05 - 0.80 x10*3/uL    Eosinophils Absolute 0.07 0.00 - 0.40 x10*3/uL    Basophils Absolute 0.07 0.00 - 0.10 x10*3/uL   Comprehensive metabolic panel   Result Value Ref Range    Glucose 159 (H) 74 - 99 mg/dL    Sodium 139 136 - 145 mmol/L    Potassium 3.8 3.5 - 5.3 mmol/L    Chloride 103 98 - 107 mmol/L    Bicarbonate 28 21 - 32 mmol/L    Anion Gap 12 10 - 20 mmol/L    Urea Nitrogen 7 6 - 23 mg/dL    Creatinine 0.37 (L) 0.50 - 1.05 mg/dL    eGFR >90 >60 mL/min/1.73m*2    Calcium 9.1 8.6 - 10.3 mg/dL    Albumin 3.8 3.4 - 5.0 g/dL    Alkaline Phosphatase 93 33 - 136 U/L    Total Protein 6.9 6.4 - 8.2 g/dL    AST 12 9 - 39 U/L    Bilirubin, Total 0.3 0.0 - 1.2 mg/dL    ALT 10 7 - 45 U/L   B-Type Natriuretic Peptide   Result Value Ref Range     (H) 0 - 99 pg/mL   Troponin I, High Sensitivity   Result Value Ref Range    Troponin I, High Sensitivity 4 0 - 13 ng/L   C. difficile, PCR    Specimen: Stool   Result Value Ref Range    C. difficile, PCR Not Detected Not Detected   Urinalysis with Reflex Culture and Microscopic   Result Value Ref Range    Color, Urine Yellow Straw, Yellow    Appearance, Urine Clear Clear    Specific Gravity, Urine 1.008 1.005 - 1.035    pH, Urine 7.0 5.0, 5.5, 6.0, 6.5, 7.0, 7.5, 8.0    Protein, Urine NEGATIVE NEGATIVE mg/dL    Glucose, Urine NEGATIVE NEGATIVE mg/dL    Blood, Urine NEGATIVE NEGATIVE    Ketones, Urine NEGATIVE NEGATIVE mg/dL    Bilirubin, Urine NEGATIVE NEGATIVE    Urobilinogen, Urine <2.0 <2.0 mg/dL    Nitrite, Urine NEGATIVE NEGATIVE    Leukocyte Esterase, Urine TRACE (A)  NEGATIVE   Microscopic Only, Urine   Result Value Ref Range    WBC, Urine 11-20 (A) 1-5, NONE /HPF    RBC, Urine NONE NONE, 1-2, 3-5 /HPF    Bacteria, Urine 1+ (A) NONE SEEN /HPF        XR chest 1 view    Result Date: 2/2/2024  Interpreted By:  Reggie Freedman, STUDY: XR CHEST 1 VIEW;  2/2/2024 5:24 pm   INDICATION: Signs/Symptoms:leg edema.   COMPARISON: None.   ACCESSION NUMBER(S): [id number]   ORDERING CLINICIAN: MATIAS ENRIQUEZ   FINDINGS:   The cardiac silhouette is mildly enlarged. Costophrenic angles are sharp. Lungs are clear. The trachea is midline. There is no pneumothorax. No acute osseous abnormality is seen. There are bilateral broad-based subacromial spur.       1.  Mild cardiomegaly. No acute pulmonary process   Signed by: Reggie Freedman 2/2/2024 5:49 PM Dictation workstation:   [id number]        Assessment/Plan       88-year-old female with a past medical history of diabetes mellitus, hypertension, COPD, ESBL UTI with bacteremia, atrial fibrillation, generalized anxiety disorder, GERD, hyperlipidemia, and chronic arthritic pain who presented to the emergency room for worsening leg edema with seepage in addition to diarrhea.  Pertinent findings on blood workup; anemia, slightly elevated BNP.  Urinalysis showed some small leukocyte esterase.    Leg edema-admit patient to the inpatient medical service with vital signs monitoring.  The etiology of the edema is still unclear.  I will order echocardiogram and TSH levels.  Patient is on amlodipine 5 mg daily.  But this remains a diagnosis of exclusion.  No need for antibiotics for now but I will order culture of the drainage.    Anemia-I will order anemia panel, FOBT, and reticulocyte count.  Close hemoglobin monitoring.    Possible UTI-continue with ceftriaxone 1 g IV daily until the urine culture final report is back    Diarrhea-etiology still unclear.  Stool C. difficile came back negative.  Stool pathogen sent.  I would hold on giving any further  Imodium until stool pathogen final result is back.    Atrial fibrillation-rate controlled.  Continue with the current metoprolol dose.  And the current Coumadin with daily INR checks.    Continue home medications except for the metformin.  Low-dose insulin sliding scale.    Lovenox for DVT prophylaxis  Full code    (This note was generated with voice recognition software and may contain errors including spelling, grammar, syntax and misrecognition of what was dictated, that are not fully corrected)     Marc Reid MD

## 2024-02-05 LAB
BACTERIA SPEC CULT: NORMAL
BACTERIA UR CULT: ABNORMAL
GRAM STN SPEC: NORMAL
GRAM STN SPEC: NORMAL

## 2024-02-06 LAB
ATRIAL RATE: 61 BPM
P AXIS: 71 DEGREES
P OFFSET: 171 MS
P ONSET: 137 MS
PR INTERVAL: 174 MS
Q ONSET: 224 MS
QRS COUNT: 10 BEATS
QRS DURATION: 78 MS
QT INTERVAL: 440 MS
QTC CALCULATION(BAZETT): 442 MS
QTC FREDERICIA: 442 MS
R AXIS: 20 DEGREES
T AXIS: 41 DEGREES
T OFFSET: 444 MS
VENTRICULAR RATE: 61 BPM

## 2024-02-07 DIAGNOSIS — I50.20 SYSTOLIC HEART FAILURE, UNSPECIFIED HF CHRONICITY (MULTI): Primary | ICD-10-CM

## 2024-02-07 DIAGNOSIS — I11.0 HYPERTENSIVE HEART DISEASE WITH HEART FAILURE (MULTI): ICD-10-CM

## 2024-02-22 ENCOUNTER — HOSPITAL ENCOUNTER (EMERGENCY)
Facility: HOSPITAL | Age: 89
Discharge: HOME | End: 2024-02-22
Attending: EMERGENCY MEDICINE
Payer: MEDICARE

## 2024-02-22 ENCOUNTER — APPOINTMENT (OUTPATIENT)
Dept: CARDIOLOGY | Facility: HOSPITAL | Age: 89
End: 2024-02-22
Payer: MEDICARE

## 2024-02-22 ENCOUNTER — APPOINTMENT (OUTPATIENT)
Dept: RADIOLOGY | Facility: HOSPITAL | Age: 89
End: 2024-02-22
Payer: MEDICARE

## 2024-02-22 VITALS
SYSTOLIC BLOOD PRESSURE: 157 MMHG | HEART RATE: 59 BPM | WEIGHT: 160.2 LBS | TEMPERATURE: 97.4 F | BODY MASS INDEX: 26.25 KG/M2 | OXYGEN SATURATION: 94 % | DIASTOLIC BLOOD PRESSURE: 77 MMHG | RESPIRATION RATE: 17 BRPM

## 2024-02-22 DIAGNOSIS — I50.89 OTHER HEART FAILURE (MULTI): ICD-10-CM

## 2024-02-22 DIAGNOSIS — I89.0 LYMPHEDEMA: ICD-10-CM

## 2024-02-22 DIAGNOSIS — R60.0 LOCALIZED EDEMA: Primary | ICD-10-CM

## 2024-02-22 DIAGNOSIS — L03.119 CELLULITIS OF LOWER EXTREMITY, UNSPECIFIED LATERALITY: ICD-10-CM

## 2024-02-22 DIAGNOSIS — R60.9 EDEMA, UNSPECIFIED TYPE: ICD-10-CM

## 2024-02-22 DIAGNOSIS — I50.42 CHRONIC COMBINED SYSTOLIC (CONGESTIVE) AND DIASTOLIC (CONGESTIVE) HEART FAILURE (MULTI): ICD-10-CM

## 2024-02-22 LAB
ALBUMIN SERPL BCP-MCNC: 3.4 G/DL (ref 3.4–5)
ALP SERPL-CCNC: 99 U/L (ref 33–136)
ALT SERPL W P-5'-P-CCNC: 10 U/L (ref 7–45)
ANION GAP SERPL CALC-SCNC: 12 MMOL/L (ref 10–20)
AORTIC VALVE MEAN GRADIENT: 21 MMHG
AORTIC VALVE PEAK VELOCITY: 2.85 M/S
APPEARANCE UR: ABNORMAL
APTT PPP: 72 SECONDS (ref 27–38)
AST SERPL W P-5'-P-CCNC: 12 U/L (ref 9–39)
AV PEAK GRADIENT: 32.5 MMHG
AVA (PEAK VEL): 0.93 CM2
AVA (VTI): 0.85 CM2
BACTERIA #/AREA URNS AUTO: ABNORMAL /HPF
BASOPHILS # BLD AUTO: 0.08 X10*3/UL (ref 0–0.1)
BASOPHILS NFR BLD AUTO: 0.9 %
BILIRUB DIRECT SERPL-MCNC: 0 MG/DL (ref 0–0.3)
BILIRUB SERPL-MCNC: 0.2 MG/DL (ref 0–1.2)
BILIRUB UR STRIP.AUTO-MCNC: NEGATIVE MG/DL
BNP SERPL-MCNC: 134 PG/ML (ref 0–99)
BUN SERPL-MCNC: 9 MG/DL (ref 6–23)
CALCIUM SERPL-MCNC: 8.6 MG/DL (ref 8.6–10.3)
CHLORIDE SERPL-SCNC: 101 MMOL/L (ref 98–107)
CO2 SERPL-SCNC: 31 MMOL/L (ref 21–32)
COLOR UR: YELLOW
CREAT SERPL-MCNC: 0.45 MG/DL (ref 0.5–1.05)
EGFRCR SERPLBLD CKD-EPI 2021: >90 ML/MIN/1.73M*2
EJECTION FRACTION APICAL 4 CHAMBER: 64.9
EJECTION FRACTION: 58 %
EOSINOPHIL # BLD AUTO: 0.22 X10*3/UL (ref 0–0.4)
EOSINOPHIL NFR BLD AUTO: 2.4 %
ERYTHROCYTE [DISTWIDTH] IN BLOOD BY AUTOMATED COUNT: 19 % (ref 11.5–14.5)
GLUCOSE SERPL-MCNC: 149 MG/DL (ref 74–99)
GLUCOSE UR STRIP.AUTO-MCNC: NEGATIVE MG/DL
HCT VFR BLD AUTO: 34.7 % (ref 36–46)
HGB BLD-MCNC: 10.3 G/DL (ref 12–16)
HOLD SPECIMEN: NORMAL
IMM GRANULOCYTES # BLD AUTO: 0.09 X10*3/UL (ref 0–0.5)
IMM GRANULOCYTES NFR BLD AUTO: 1 % (ref 0–0.9)
INR PPP: ABNORMAL
KETONES UR STRIP.AUTO-MCNC: NEGATIVE MG/DL
LACTATE SERPL-SCNC: 2.2 MMOL/L (ref 0.4–2)
LEFT ATRIUM VOLUME AREA LENGTH INDEX BSA: 22.8 ML/M2
LEFT VENTRICLE INTERNAL DIMENSION DIASTOLE: 4.29 CM (ref 3.5–6)
LEFT VENTRICULAR OUTFLOW TRACT DIAMETER: 1.7 CM
LEUKOCYTE ESTERASE UR QL STRIP.AUTO: ABNORMAL
LYMPHOCYTES # BLD AUTO: 1.78 X10*3/UL (ref 0.8–3)
LYMPHOCYTES NFR BLD AUTO: 19.6 %
MAGNESIUM SERPL-MCNC: 1.48 MG/DL (ref 1.6–2.4)
MCH RBC QN AUTO: 23.9 PG (ref 26–34)
MCHC RBC AUTO-ENTMCNC: 29.7 G/DL (ref 32–36)
MCV RBC AUTO: 81 FL (ref 80–100)
MITRAL VALVE E/A RATIO: 0.81
MONOCYTES # BLD AUTO: 0.87 X10*3/UL (ref 0.05–0.8)
MONOCYTES NFR BLD AUTO: 9.6 %
MUCOUS THREADS #/AREA URNS AUTO: ABNORMAL /LPF
NEUTROPHILS # BLD AUTO: 6.05 X10*3/UL (ref 1.6–5.5)
NEUTROPHILS NFR BLD AUTO: 66.5 %
NITRITE UR QL STRIP.AUTO: NEGATIVE
NRBC BLD-RTO: 0 /100 WBCS (ref 0–0)
PH UR STRIP.AUTO: 7 [PH]
PLATELET # BLD AUTO: 447 X10*3/UL (ref 150–450)
POTASSIUM SERPL-SCNC: 3.7 MMOL/L (ref 3.5–5.3)
PROT SERPL-MCNC: 6.3 G/DL (ref 6.4–8.2)
PROT UR STRIP.AUTO-MCNC: NEGATIVE MG/DL
PROTHROMBIN TIME: >100 SECONDS (ref 9.8–12.8)
RBC # BLD AUTO: 4.31 X10*6/UL (ref 4–5.2)
RBC # UR STRIP.AUTO: NEGATIVE /UL
RBC #/AREA URNS AUTO: ABNORMAL /HPF
RIGHT VENTRICLE FREE WALL PEAK S': 16.5 CM/S
RIGHT VENTRICLE PEAK SYSTOLIC PRESSURE: 28.8 MMHG
SODIUM SERPL-SCNC: 140 MMOL/L (ref 136–145)
SP GR UR STRIP.AUTO: 1.01
SQUAMOUS #/AREA URNS AUTO: ABNORMAL /HPF
UROBILINOGEN UR STRIP.AUTO-MCNC: <2 MG/DL
WBC # BLD AUTO: 9.1 X10*3/UL (ref 4.4–11.3)
WBC #/AREA URNS AUTO: >50 /HPF

## 2024-02-22 PROCEDURE — 83880 ASSAY OF NATRIURETIC PEPTIDE: CPT | Performed by: EMERGENCY MEDICINE

## 2024-02-22 PROCEDURE — 2500000004 HC RX 250 GENERAL PHARMACY W/ HCPCS (ALT 636 FOR OP/ED): Performed by: EMERGENCY MEDICINE

## 2024-02-22 PROCEDURE — 83735 ASSAY OF MAGNESIUM: CPT | Performed by: EMERGENCY MEDICINE

## 2024-02-22 PROCEDURE — 96365 THER/PROPH/DIAG IV INF INIT: CPT | Mod: 59

## 2024-02-22 PROCEDURE — 99284 EMERGENCY DEPT VISIT MOD MDM: CPT | Performed by: INTERNAL MEDICINE

## 2024-02-22 PROCEDURE — 83605 ASSAY OF LACTIC ACID: CPT | Performed by: EMERGENCY MEDICINE

## 2024-02-22 PROCEDURE — 81001 URINALYSIS AUTO W/SCOPE: CPT | Performed by: EMERGENCY MEDICINE

## 2024-02-22 PROCEDURE — 93005 ELECTROCARDIOGRAM TRACING: CPT

## 2024-02-22 PROCEDURE — 93306 TTE W/DOPPLER COMPLETE: CPT | Performed by: STUDENT IN AN ORGANIZED HEALTH CARE EDUCATION/TRAINING PROGRAM

## 2024-02-22 PROCEDURE — 87086 URINE CULTURE/COLONY COUNT: CPT | Mod: SAMLAB | Performed by: EMERGENCY MEDICINE

## 2024-02-22 PROCEDURE — 71045 X-RAY EXAM CHEST 1 VIEW: CPT | Mod: FOREIGN READ | Performed by: RADIOLOGY

## 2024-02-22 PROCEDURE — 2500000004 HC RX 250 GENERAL PHARMACY W/ HCPCS (ALT 636 FOR OP/ED): Performed by: INTERNAL MEDICINE

## 2024-02-22 PROCEDURE — 85025 COMPLETE CBC W/AUTO DIFF WBC: CPT | Performed by: EMERGENCY MEDICINE

## 2024-02-22 PROCEDURE — 36415 COLL VENOUS BLD VENIPUNCTURE: CPT | Performed by: EMERGENCY MEDICINE

## 2024-02-22 PROCEDURE — 99285 EMERGENCY DEPT VISIT HI MDM: CPT | Mod: 25

## 2024-02-22 PROCEDURE — 87040 BLOOD CULTURE FOR BACTERIA: CPT | Mod: SAMLAB | Performed by: EMERGENCY MEDICINE

## 2024-02-22 PROCEDURE — 85730 THROMBOPLASTIN TIME PARTIAL: CPT | Performed by: EMERGENCY MEDICINE

## 2024-02-22 PROCEDURE — 71045 X-RAY EXAM CHEST 1 VIEW: CPT

## 2024-02-22 PROCEDURE — 80048 BASIC METABOLIC PNL TOTAL CA: CPT | Performed by: EMERGENCY MEDICINE

## 2024-02-22 PROCEDURE — 96375 TX/PRO/DX INJ NEW DRUG ADDON: CPT | Mod: 59

## 2024-02-22 PROCEDURE — 93306 TTE W/DOPPLER COMPLETE: CPT

## 2024-02-22 PROCEDURE — 84075 ASSAY ALKALINE PHOSPHATASE: CPT | Performed by: EMERGENCY MEDICINE

## 2024-02-22 PROCEDURE — 85610 PROTHROMBIN TIME: CPT | Performed by: EMERGENCY MEDICINE

## 2024-02-22 RX ORDER — CEPHALEXIN 500 MG/1
500 CAPSULE ORAL 4 TIMES DAILY
Qty: 40 CAPSULE | Refills: 0 | Status: SHIPPED | OUTPATIENT
Start: 2024-02-22 | End: 2024-02-27 | Stop reason: WASHOUT

## 2024-02-22 RX ORDER — FUROSEMIDE 10 MG/ML
40 INJECTION INTRAMUSCULAR; INTRAVENOUS ONCE
Status: COMPLETED | OUTPATIENT
Start: 2024-02-22 | End: 2024-02-22

## 2024-02-22 RX ORDER — CEFTRIAXONE 1 G/50ML
1 INJECTION, SOLUTION INTRAVENOUS ONCE
Status: COMPLETED | OUTPATIENT
Start: 2024-02-22 | End: 2024-02-22

## 2024-02-22 RX ORDER — MUPIROCIN 20 MG/G
OINTMENT TOPICAL
Qty: 30 G | Refills: 0 | Status: SHIPPED | OUTPATIENT
Start: 2024-02-22 | End: 2024-02-27 | Stop reason: ALTCHOICE

## 2024-02-22 RX ADMIN — FUROSEMIDE 40 MG: 10 INJECTION, SOLUTION INTRAMUSCULAR; INTRAVENOUS at 14:06

## 2024-02-22 RX ADMIN — CEFTRIAXONE SODIUM 1 G: 1 INJECTION, SOLUTION INTRAVENOUS at 14:07

## 2024-02-22 RX ADMIN — PERFLUTREN 2 ML OF DILUTION: 6.52 INJECTION, SUSPENSION INTRAVENOUS at 14:44

## 2024-02-22 ASSESSMENT — PAIN SCALES - GENERAL: PAINLEVEL_OUTOF10: 0 - NO PAIN

## 2024-02-22 ASSESSMENT — ENCOUNTER SYMPTOMS
DIZZINESS: 0
COUGH: 0
DYSURIA: 0
LIGHT-HEADEDNESS: 0
PALPITATIONS: 0
EYES NEGATIVE: 1
ARTHRALGIAS: 0
BRUISES/BLEEDS EASILY: 1
VOMITING: 0
PSYCHIATRIC NEGATIVE: 1
WHEEZING: 0
MYALGIAS: 1
CHILLS: 0
NECK PAIN: 0
ABDOMINAL PAIN: 0
FEVER: 0
NAUSEA: 0
SHORTNESS OF BREATH: 0

## 2024-02-22 ASSESSMENT — COLUMBIA-SUICIDE SEVERITY RATING SCALE - C-SSRS
1. IN THE PAST MONTH, HAVE YOU WISHED YOU WERE DEAD OR WISHED YOU COULD GO TO SLEEP AND NOT WAKE UP?: NO
2. HAVE YOU ACTUALLY HAD ANY THOUGHTS OF KILLING YOURSELF?: NO

## 2024-02-22 ASSESSMENT — PAIN - FUNCTIONAL ASSESSMENT: PAIN_FUNCTIONAL_ASSESSMENT: 0-10

## 2024-02-22 NOTE — CONSULTS
Reason For Consult  Leg Redness    History Of Present Illness  Mayte Pulliam is a 88 y.o. female presenting with Leg Redness from Assisted Living.  She was recently here with swelling  She is supposed to get an ECHO  She has not had it yet  Has no pain or major complaints  Wants to go home.       Past Medical History  She has a past medical history of Anxiety, COPD (chronic obstructive pulmonary disease) (CMS/[facility]), COVID-19, Depression, Depression, Diabetes mellitus (CMS/[facility]), GERD (gastroesophageal reflux disease), Hyperlipidemia, Hypertension, Hypomagnesemia, Malnutrition (CMS/[facility]), and Osteoarthritis.    Surgical History  She has no past surgical history on file.     Social History  She reports that she has never smoked. She has never used smokeless tobacco. She reports that she does not currently use alcohol. She reports that she does not use drugs.    Family History  No family history on file.     Allergies  Patient has no known allergies.    Review of Systems  A Full 10 Point ROS was obtained and is negative except for the HPI as above     Physical Exam  Physical Exam   Constitutional  She appears well-developed and well-nourished.     Eyes  Pupils are equal, round, and reactive to light. System normal.       Cardiovascular: Normal rate.   Irreular     Pulmonary/Chest  Effort normal and breath sounds normal.     Skin  There is erythema.   [place] Swelling of B/L LE  Red[place], SubQ bruising  No Obvious Infection     Psychiatric  She has a normal mood and affect.              I&O 24HR  No intake or output data in the 24 hours ending 02/22/24 1340    Vitals 24HR  Heart Rate:  [57-60]   Temperature:  [36.3 °C (97.4 °F)]   Respirations:  [16-18]   BP: (157-185)/(70-77)   Weight:  [72.7 kg (160 lb 3.2 oz)]   Pulse Ox:  [93 %-95 %]         Relevant Results  Reviewed     Assessment/Plan       Peripheral Edema with Volume Overload  Possible CHF  Erythema of B/L LE  UTI    Plan:  Give IV Rosephin  Give IV Lasix x1  Send  home on PO Keflex  Follow urine culture  Get ECHO here  See me in office next week with BMP.  Call with any issues    Outpatient:  Keep Legs wrapped daily with off at night  Keep Lasix 40 mg PO daiy for now.  Swelling is much better      Active Problems:  There are no active Hospital Problems.          Yair Benavidez, DO

## 2024-02-22 NOTE — ED PROVIDER NOTES
Chief Complaint: SWOLLEN LEGS    Is an 88-year-old female who was admitted February 2 for peripheral edema she was diuresed and had her legs wrapped she has some renal insufficiency.  She was seen by Dr. Benavidez at that time.  She now presents with return of the edema with some redness of the legs she denies any difficulty breathing no fever or chills otherwise           Review of Systems   Constitutional:  Negative for chills and fever.   HENT: Negative.     Eyes: Negative.    Respiratory:  Negative for cough, shortness of breath and wheezing.    Cardiovascular:  Positive for leg swelling. Negative for chest pain and palpitations.   Gastrointestinal:  Negative for abdominal pain, nausea and vomiting.   Genitourinary:  Negative for dysuria.   Musculoskeletal:  Positive for myalgias. Negative for arthralgias and neck pain.   Skin:  Positive for rash.   Neurological:  Negative for dizziness and light-headedness.   Hematological:  Bruises/bleeds easily.   Psychiatric/Behavioral: Negative.     All other systems reviewed and are negative.       Physical Exam  Constitutional:       General: She is not in acute distress.     Appearance: She is obese. She is not toxic-appearing.   HENT:      Head: Normocephalic and atraumatic.      Nose: Nose normal.      Mouth/Throat:      Mouth: Mucous membranes are moist.      Pharynx: Oropharynx is clear.   Eyes:      Extraocular Movements: Extraocular movements intact.      Conjunctiva/sclera: Conjunctivae normal.      Pupils: Pupils are equal, round, and reactive to light.   Neck:      Vascular: No carotid bruit.   Cardiovascular:      Rate and Rhythm: Normal rate.      Pulses: Normal pulses.      Heart sounds: No murmur heard.  Pulmonary:      Effort: Pulmonary effort is normal. No respiratory distress.   Abdominal:      General: Bowel sounds are normal. There is no distension.      Palpations: There is no mass.   Musculoskeletal:      Cervical back: Normal range of motion and neck  supple.      Comments: Her legs show bilaterally 2-3+ edema there are several blisters that are open she has several ecchymotic areas also at this time she has good pedal pulses however   Skin:     General: Skin is warm and dry.      Capillary Refill: Capillary refill takes less than 2 seconds.      Findings: Erythema and rash present.   Neurological:      General: No focal deficit present.      Mental Status: She is alert and oriented to person, place, and time.   Psychiatric:         Mood and Affect: Mood normal.          Labs Reviewed   CBC WITH AUTO DIFFERENTIAL - Abnormal       Result Value    WBC 9.1      nRBC 0.0      RBC 4.31      Hemoglobin 10.3 (*)     Hematocrit 34.7 (*)     MCV 81      MCH 23.9 (*)     MCHC 29.7 (*)     RDW 19.0 (*)     Platelets 447      Neutrophils % 66.5      Immature Granulocytes %, Automated 1.0 (*)     Lymphocytes % 19.6      Monocytes % 9.6      Eosinophils % 2.4      Basophils % 0.9      Neutrophils Absolute 6.05 (*)     Immature Granulocytes Absolute, Automated 0.09      Lymphocytes Absolute 1.78      Monocytes Absolute 0.87 (*)     Eosinophils Absolute 0.22      Basophils Absolute 0.08     BASIC METABOLIC PANEL - Abnormal    Glucose 149 (*)     Sodium 140      Potassium 3.7      Chloride 101      Bicarbonate 31      Anion Gap 12      Urea Nitrogen 9      Creatinine 0.45 (*)     eGFR >90      Calcium 8.6     MAGNESIUM - Abnormal    Magnesium 1.48 (*)    HEPATIC FUNCTION PANEL - Abnormal    Albumin 3.4      Bilirubin, Total 0.2      Bilirubin, Direct 0.0      Alkaline Phosphatase 99      ALT 10      AST 12      Total Protein 6.3 (*)    PROTIME-INR - Abnormal    Protime >100.0 (*)     INR       APTT - Abnormal    aPTT 72 (*)     Narrative:     The APTT is no longer used for monitoring Unfractionated Heparin Therapy. For monitoring Heparin Therapy, use the Heparin Assay.   B-TYPE NATRIURETIC PEPTIDE - Abnormal     (*)     Narrative:        <100 pg/mL - Heart failure  unlikely  100-299 pg/mL - Intermediate probability of acute heart                  failure exacerbation. Correlate with clinical                  context and patient history.    >=300 pg/mL - Heart Failure likely. Correlate with clinical                  context and patient history.    BNP testing is performed using different testing methodology at [facility] than at other [facility]. Direct result comparisons should only be made within the same method.      LACTATE - Abnormal    Lactate 2.2 (*)     Narrative:     Venipuncture immediately after or during the administration of Metamizole may lead to falsely low results. Testing should be performed immediately  prior to Metamizole dosing.   URINALYSIS WITH REFLEX CULTURE AND MICROSCOPIC - Abnormal    Color, Urine Yellow      Appearance, Urine Hazy (*)     Specific Gravity, Urine 1.008      pH, Urine 7.0      Protein, Urine NEGATIVE      Glucose, Urine NEGATIVE      Blood, Urine NEGATIVE      Ketones, Urine NEGATIVE      Bilirubin, Urine NEGATIVE      Urobilinogen, Urine <2.0      Nitrite, Urine NEGATIVE      Leukocyte Esterase, Urine LARGE (3+) (*)    MICROSCOPIC ONLY, URINE - Abnormal    WBC, Urine >50 (*)     RBC, Urine 11-20 (*)     Squamous Epithelial Cells, Urine 1-9 (SPARSE)      Bacteria, Urine 1+ (*)     Mucus, Urine 1+     BLOOD CULTURE   BLOOD CULTURE   URINE CULTURE   URINALYSIS WITH REFLEX CULTURE AND MICROSCOPIC    Narrative:     The following orders were created for panel order Urinalysis with Reflex Culture and Microscopic.  Procedure                               Abnormality         Status                     ---------                               -----------         ------                     Urinalysis with Reflex C...[[id number]]  Abnormal            Final result               Extra Urine Gray Tube[[id number]]                            In process                   Please view results for these tests on the individual orders.    EXTRA URINE GRAY TUBE        XR chest 1 view   Final Result   No acute process.   Signed by Chi Cabrera MD      Transthoracic Echo (TTE) Complete    (Results Pending)        Procedures     Medical Decision Making  Differential diagnose include CHF pedal edema cellulitis.  Analysis positive for UTI with greater than 50 white cells per high-powered field and 11-20 RBCs.  BMP was normal at 134 lactate was slightly elevated 2.2 her INR was elevated.  Bilirubin was negative metabolic panel was normal count was 9.1 with a normal hemoglobin hematocrit chest x-ray was negative.  Dr. Benavidez her nephrologist was contacted was here and examined the patient states the swelling in her legs actually improved significantly at this time.  An echo was done while here in the emergency department and she received Lasix 40 mg IV as well as Rocephin intravenously.  She will be started on Keflex for the UTI has follow-up with Dr. Benavidez this week as scheduled.  She is to hold her Coumadin until next week to have a repeat INR on Monday.    Amount and/or Complexity of Data Reviewed  ECG/medicine tests: independent interpretation performed.     Details: Twelve-lead EKG showed sinus bradycardia at 57/min but no acute ST segment elevation or depressions at this time         Diagnoses as of 02/22/24 1509   Lymphedema   Cellulitis of lower extremity, unspecified laterality                    Stephen Dias MD  02/22/24 1505

## 2024-02-22 NOTE — DISCHARGE INSTRUCTIONS
KEEP LEGS WRAPPED AND ELEVATED  KEFLEX START TOMORROW FOR INFECTION  BACTROBAN OINT FOR AREAS OF REDNESS  HOLD COUMADIN UNTIL MONDAY NEXT WEEK THEN HAVE REPPEAT INR

## 2024-02-23 LAB — HOLD SPECIMEN: NORMAL

## 2024-02-24 LAB
ATRIAL RATE: 57 BPM
BACTERIA UR CULT: ABNORMAL
BACTERIA UR CULT: ABNORMAL
CARBA5 NEG: ABNORMAL
Lab: ABNORMAL
Lab: DETECTED
P AXIS: 44 DEGREES
P OFFSET: 194 MS
P ONSET: 131 MS
PR INTERVAL: 184 MS
Q ONSET: 223 MS
QRS COUNT: 9 BEATS
QRS DURATION: 76 MS
QT INTERVAL: 436 MS
QTC CALCULATION(BAZETT): 424 MS
QTC FREDERICIA: 428 MS
R AXIS: 31 DEGREES
T AXIS: 69 DEGREES
T OFFSET: 441 MS
VENTRICULAR RATE: 57 BPM

## 2024-02-25 ENCOUNTER — TELEPHONE (OUTPATIENT)
Dept: PHARMACY | Facility: HOSPITAL | Age: 89
End: 2024-02-25
Payer: MEDICARE

## 2024-02-25 NOTE — TELEPHONE ENCOUNTER
I reviewed the progress note and agree with the resident’s findings and plans as written. Case discussed with resident.    Cris Barroso, ChristenD

## 2024-02-25 NOTE — PROGRESS NOTES
EDPD Note: Lab/Chart Reviewed    Reviewed  Mayte Pulliam 's chart regarding a positive urine culture/result that was taken during their recent emergency room visit. The patient was transferred back to their rehab/LTC facility .Therefore, I have faxed this information to [facility] at fax number [phone] .    Susceptibility data from last 90 days.  Collected Specimen Info Organism Amoxicillin/Clavulanate Ampicillin Ampicillin/Sulbactam Aztreonam Cefazolin Cefazolin (uncomplicated UTIs only) Cefepime Cefotaxime Ceftazidime Ceftriaxone   02/22/24 Urine from Straight Catheter Escherichia coli R R I R R R R R R R     Klebsiella (Enterobacter) aerogenes R R R R R  SDD      02/02/24 Urine from Clean Catch/Voided Escherichia coli S R S R R R R R R R     Collected Specimen Info Organism Ciprofloxacin Ertapenem Gentamicin Imipenem Meropenem Nitrofurantoin Piperacillin/Tazobactam Tobramycin Trimethoprim/Sulfamethoxazole   02/22/24 Urine from Straight Catheter Escherichia coli R S R  S R S R R     Klebsiella (Enterobacter) aerogenes R S R I S I S R R   02/02/24 Urine from Clean Catch/Voided Escherichia coli R S R  S R S R R       No further follow up needed from EDPD Team.     Johny Gill, ChristenD

## 2024-02-26 ENCOUNTER — TELEPHONE (OUTPATIENT)
Dept: NEPHROLOGY | Facility: CLINIC | Age: 89
End: 2024-02-26
Payer: MEDICARE

## 2024-02-26 NOTE — PROGRESS NOTES
TREATED FOR EDEMA IN HOSPITAL AND WANTED PT INR REPEATED TODAY.  THEIR LAB AT THE [facility] IS OUT UNTIL WEDNESDAY SO THEY WILL BE UNABLE TO GET TESTING UNTIL THEN.  PATIENT IS NOT MOBILE.    THANKS.

## 2024-02-27 ENCOUNTER — OFFICE VISIT (OUTPATIENT)
Dept: NEPHROLOGY | Facility: CLINIC | Age: 89
End: 2024-02-27
Payer: MEDICARE

## 2024-02-27 ENCOUNTER — HOSPITAL ENCOUNTER (INPATIENT)
Facility: HOSPITAL | Age: 89
LOS: 2 days | Discharge: HOME | DRG: 291 | End: 2024-02-29
Attending: STUDENT IN AN ORGANIZED HEALTH CARE EDUCATION/TRAINING PROGRAM | Admitting: STUDENT IN AN ORGANIZED HEALTH CARE EDUCATION/TRAINING PROGRAM
Payer: MEDICARE

## 2024-02-27 VITALS
SYSTOLIC BLOOD PRESSURE: 154 MMHG | HEIGHT: 66 IN | WEIGHT: 160 LBS | DIASTOLIC BLOOD PRESSURE: 84 MMHG | HEART RATE: 60 BPM | BODY MASS INDEX: 25.71 KG/M2

## 2024-02-27 DIAGNOSIS — N39.0 URINARY TRACT INFECTION WITHOUT HEMATURIA, SITE UNSPECIFIED: ICD-10-CM

## 2024-02-27 DIAGNOSIS — R60.0 LOCALIZED EDEMA: ICD-10-CM

## 2024-02-27 DIAGNOSIS — N39.0 URINARY TRACT INFECTION DUE TO EXTENDED-SPECTRUM BETA LACTAMASE (ESBL) PRODUCING ESCHERICHIA COLI: ICD-10-CM

## 2024-02-27 DIAGNOSIS — I50.33 ACUTE ON CHRONIC DIASTOLIC CONGESTIVE HEART FAILURE (MULTI): Primary | ICD-10-CM

## 2024-02-27 DIAGNOSIS — Z16.12 URINARY TRACT INFECTION DUE TO EXTENDED-SPECTRUM BETA LACTAMASE (ESBL) PRODUCING ESCHERICHIA COLI: ICD-10-CM

## 2024-02-27 DIAGNOSIS — I48.0 PAROXYSMAL ATRIAL FIBRILLATION (MULTI): ICD-10-CM

## 2024-02-27 DIAGNOSIS — E78.5 HYPERLIPIDEMIA, UNSPECIFIED HYPERLIPIDEMIA TYPE: ICD-10-CM

## 2024-02-27 DIAGNOSIS — B96.29 URINARY TRACT INFECTION DUE TO EXTENDED-SPECTRUM BETA LACTAMASE (ESBL) PRODUCING ESCHERICHIA COLI: ICD-10-CM

## 2024-02-27 DIAGNOSIS — I50.41: Primary | ICD-10-CM

## 2024-02-27 DIAGNOSIS — I38: Primary | ICD-10-CM

## 2024-02-27 PROBLEM — I50.30 DIASTOLIC CHF (MULTI): Status: ACTIVE | Noted: 2024-02-27

## 2024-02-27 PROBLEM — R60.9 EDEMA: Status: ACTIVE | Noted: 2024-02-27

## 2024-02-27 LAB
ALBUMIN SERPL BCP-MCNC: 3.4 G/DL (ref 3.4–5)
ANION GAP SERPL CALC-SCNC: 11 MMOL/L (ref 10–20)
APPEARANCE UR: CLEAR
APTT PPP: 31 SECONDS (ref 27–38)
BACTERIA BLD CULT: NORMAL
BACTERIA BLD CULT: NORMAL
BILIRUB UR STRIP.AUTO-MCNC: NEGATIVE MG/DL
BNP SERPL-MCNC: 221 PG/ML (ref 0–99)
BUN SERPL-MCNC: 7 MG/DL (ref 6–23)
CALCIUM SERPL-MCNC: 9 MG/DL (ref 8.6–10.3)
CHLORIDE SERPL-SCNC: 103 MMOL/L (ref 98–107)
CO2 SERPL-SCNC: 31 MMOL/L (ref 21–32)
COLOR UR: COLORLESS
CREAT SERPL-MCNC: 0.42 MG/DL (ref 0.5–1.05)
EGFRCR SERPLBLD CKD-EPI 2021: >90 ML/MIN/1.73M*2
ERYTHROCYTE [DISTWIDTH] IN BLOOD BY AUTOMATED COUNT: 20.1 % (ref 11.5–14.5)
GLUCOSE SERPL-MCNC: 110 MG/DL (ref 74–99)
GLUCOSE UR STRIP.AUTO-MCNC: NEGATIVE MG/DL
HCT VFR BLD AUTO: 33.6 % (ref 36–46)
HGB BLD-MCNC: 9.9 G/DL (ref 12–16)
INR PPP: 1.4 (ref 0.9–1.1)
KETONES UR STRIP.AUTO-MCNC: NEGATIVE MG/DL
LACTATE SERPL-SCNC: 1.4 MMOL/L (ref 0.4–2)
LEUKOCYTE ESTERASE UR QL STRIP.AUTO: NEGATIVE
MAGNESIUM SERPL-MCNC: 1.67 MG/DL (ref 1.6–2.4)
MCH RBC QN AUTO: 23.9 PG (ref 26–34)
MCHC RBC AUTO-ENTMCNC: 29.5 G/DL (ref 32–36)
MCV RBC AUTO: 81 FL (ref 80–100)
NITRITE UR QL STRIP.AUTO: NEGATIVE
NRBC BLD-RTO: 0 /100 WBCS (ref 0–0)
PH UR STRIP.AUTO: 8 [PH]
PHOSPHATE SERPL-MCNC: 3.5 MG/DL (ref 2.5–4.9)
PLATELET # BLD AUTO: 408 X10*3/UL (ref 150–450)
POTASSIUM SERPL-SCNC: 3.9 MMOL/L (ref 3.5–5.3)
PROT UR STRIP.AUTO-MCNC: NEGATIVE MG/DL
PROTHROMBIN TIME: 15.7 SECONDS (ref 9.8–12.8)
RBC # BLD AUTO: 4.14 X10*6/UL (ref 4–5.2)
RBC # UR STRIP.AUTO: NEGATIVE /UL
SODIUM SERPL-SCNC: 141 MMOL/L (ref 136–145)
SP GR UR STRIP.AUTO: 1.01
UROBILINOGEN UR STRIP.AUTO-MCNC: <2 MG/DL
WBC # BLD AUTO: 8.9 X10*3/UL (ref 4.4–11.3)

## 2024-02-27 PROCEDURE — 83880 ASSAY OF NATRIURETIC PEPTIDE: CPT | Performed by: STUDENT IN AN ORGANIZED HEALTH CARE EDUCATION/TRAINING PROGRAM

## 2024-02-27 PROCEDURE — 1160F RVW MEDS BY RX/DR IN RCRD: CPT | Performed by: INTERNAL MEDICINE

## 2024-02-27 PROCEDURE — 2500000004 HC RX 250 GENERAL PHARMACY W/ HCPCS (ALT 636 FOR OP/ED): Performed by: STUDENT IN AN ORGANIZED HEALTH CARE EDUCATION/TRAINING PROGRAM

## 2024-02-27 PROCEDURE — 81003 URINALYSIS AUTO W/O SCOPE: CPT | Performed by: STUDENT IN AN ORGANIZED HEALTH CARE EDUCATION/TRAINING PROGRAM

## 2024-02-27 PROCEDURE — 99215 OFFICE O/P EST HI 40 MIN: CPT | Performed by: INTERNAL MEDICINE

## 2024-02-27 PROCEDURE — 1200000002 HC GENERAL ROOM WITH TELEMETRY DAILY

## 2024-02-27 PROCEDURE — 1159F MED LIST DOCD IN RCRD: CPT | Performed by: INTERNAL MEDICINE

## 2024-02-27 PROCEDURE — 1111F DSCHRG MED/CURRENT MED MERGE: CPT | Performed by: INTERNAL MEDICINE

## 2024-02-27 PROCEDURE — 1126F AMNT PAIN NOTED NONE PRSNT: CPT | Performed by: INTERNAL MEDICINE

## 2024-02-27 PROCEDURE — 2500000002 HC RX 250 W HCPCS SELF ADMINISTERED DRUGS (ALT 637 FOR MEDICARE OP, ALT 636 FOR OP/ED): Performed by: STUDENT IN AN ORGANIZED HEALTH CARE EDUCATION/TRAINING PROGRAM

## 2024-02-27 PROCEDURE — 99222 1ST HOSP IP/OBS MODERATE 55: CPT | Performed by: STUDENT IN AN ORGANIZED HEALTH CARE EDUCATION/TRAINING PROGRAM

## 2024-02-27 PROCEDURE — 87040 BLOOD CULTURE FOR BACTERIA: CPT | Mod: SAMLAB | Performed by: STUDENT IN AN ORGANIZED HEALTH CARE EDUCATION/TRAINING PROGRAM

## 2024-02-27 PROCEDURE — 36415 COLL VENOUS BLD VENIPUNCTURE: CPT | Performed by: STUDENT IN AN ORGANIZED HEALTH CARE EDUCATION/TRAINING PROGRAM

## 2024-02-27 PROCEDURE — 80069 RENAL FUNCTION PANEL: CPT | Performed by: STUDENT IN AN ORGANIZED HEALTH CARE EDUCATION/TRAINING PROGRAM

## 2024-02-27 PROCEDURE — 83735 ASSAY OF MAGNESIUM: CPT | Performed by: STUDENT IN AN ORGANIZED HEALTH CARE EDUCATION/TRAINING PROGRAM

## 2024-02-27 PROCEDURE — 85027 COMPLETE CBC AUTOMATED: CPT | Performed by: STUDENT IN AN ORGANIZED HEALTH CARE EDUCATION/TRAINING PROGRAM

## 2024-02-27 PROCEDURE — 2500000001 HC RX 250 WO HCPCS SELF ADMINISTERED DRUGS (ALT 637 FOR MEDICARE OP): Performed by: STUDENT IN AN ORGANIZED HEALTH CARE EDUCATION/TRAINING PROGRAM

## 2024-02-27 PROCEDURE — 85610 PROTHROMBIN TIME: CPT | Performed by: STUDENT IN AN ORGANIZED HEALTH CARE EDUCATION/TRAINING PROGRAM

## 2024-02-27 PROCEDURE — 83605 ASSAY OF LACTIC ACID: CPT | Performed by: STUDENT IN AN ORGANIZED HEALTH CARE EDUCATION/TRAINING PROGRAM

## 2024-02-27 RX ORDER — FUROSEMIDE 40 MG/1
40 TABLET ORAL DAILY
Status: DISCONTINUED | OUTPATIENT
Start: 2024-02-27 | End: 2024-02-29 | Stop reason: HOSPADM

## 2024-02-27 RX ORDER — ACETAMINOPHEN 325 MG/1
650 TABLET ORAL NIGHTLY
COMMUNITY

## 2024-02-27 RX ORDER — ACETAMINOPHEN 325 MG/1
650 TABLET ORAL EVERY 4 HOURS PRN
Status: DISCONTINUED | OUTPATIENT
Start: 2024-02-27 | End: 2024-02-29 | Stop reason: HOSPADM

## 2024-02-27 RX ORDER — MENTHOL 40 MG/ML
1 GEL TOPICAL 3 TIMES DAILY PRN
COMMUNITY

## 2024-02-27 RX ORDER — ERTAPENEM 1 G/1
1 INJECTION, POWDER, LYOPHILIZED, FOR SOLUTION INTRAMUSCULAR; INTRAVENOUS EVERY 24 HOURS
Status: DISCONTINUED | OUTPATIENT
Start: 2024-02-27 | End: 2024-02-29 | Stop reason: HOSPADM

## 2024-02-27 RX ORDER — ESCITALOPRAM OXALATE 10 MG/1
10 TABLET ORAL EVERY MORNING
Status: DISCONTINUED | OUTPATIENT
Start: 2024-02-27 | End: 2024-02-29 | Stop reason: HOSPADM

## 2024-02-27 RX ORDER — ATORVASTATIN CALCIUM 20 MG/1
20 TABLET, FILM COATED ORAL NIGHTLY
Status: DISCONTINUED | OUTPATIENT
Start: 2024-02-27 | End: 2024-02-29 | Stop reason: HOSPADM

## 2024-02-27 RX ORDER — MUPIROCIN 20 MG/G
1 OINTMENT TOPICAL 3 TIMES DAILY
COMMUNITY

## 2024-02-27 RX ORDER — TALC
3 POWDER (GRAM) TOPICAL DAILY
Status: DISCONTINUED | OUTPATIENT
Start: 2024-02-27 | End: 2024-02-29 | Stop reason: HOSPADM

## 2024-02-27 RX ORDER — ONDANSETRON 4 MG/1
4 TABLET, FILM COATED ORAL EVERY 4 HOURS PRN
COMMUNITY

## 2024-02-27 RX ORDER — WARFARIN 7.5 MG/1
7.5 TABLET ORAL ONCE
Status: COMPLETED | OUTPATIENT
Start: 2024-02-27 | End: 2024-02-27

## 2024-02-27 RX ORDER — NYSTATIN 100000 [USP'U]/G
1 POWDER TOPICAL 2 TIMES DAILY
Status: DISCONTINUED | OUTPATIENT
Start: 2024-02-27 | End: 2024-02-29 | Stop reason: HOSPADM

## 2024-02-27 RX ORDER — GABAPENTIN 300 MG/1
600 CAPSULE ORAL NIGHTLY
Status: DISCONTINUED | OUTPATIENT
Start: 2024-02-27 | End: 2024-02-29 | Stop reason: HOSPADM

## 2024-02-27 RX ORDER — FUROSEMIDE 10 MG/ML
40 INJECTION INTRAMUSCULAR; INTRAVENOUS EVERY 12 HOURS
Status: DISCONTINUED | OUTPATIENT
Start: 2024-02-27 | End: 2024-02-29 | Stop reason: HOSPADM

## 2024-02-27 RX ORDER — WARFARIN SODIUM 5 MG/1
5 TABLET ORAL DAILY
Status: DISCONTINUED | OUTPATIENT
Start: 2024-02-27 | End: 2024-02-27

## 2024-02-27 RX ORDER — CEPHALEXIN 500 MG/1
500 CAPSULE ORAL 4 TIMES DAILY
COMMUNITY
Start: 2024-02-22 | End: 2024-02-29 | Stop reason: HOSPADM

## 2024-02-27 RX ORDER — ONDANSETRON 4 MG/1
4 TABLET, ORALLY DISINTEGRATING ORAL EVERY 8 HOURS PRN
Status: DISCONTINUED | OUTPATIENT
Start: 2024-02-27 | End: 2024-02-29 | Stop reason: HOSPADM

## 2024-02-27 RX ORDER — PANTOPRAZOLE SODIUM 40 MG/1
40 TABLET, DELAYED RELEASE ORAL DAILY
Status: DISCONTINUED | OUTPATIENT
Start: 2024-02-27 | End: 2024-02-29 | Stop reason: HOSPADM

## 2024-02-27 RX ORDER — GABAPENTIN 300 MG/1
300 CAPSULE ORAL EVERY MORNING
Status: DISCONTINUED | OUTPATIENT
Start: 2024-02-27 | End: 2024-02-29 | Stop reason: HOSPADM

## 2024-02-27 RX ORDER — METOPROLOL TARTRATE 50 MG/1
50 TABLET ORAL 2 TIMES DAILY
Status: DISCONTINUED | OUTPATIENT
Start: 2024-02-27 | End: 2024-02-29 | Stop reason: HOSPADM

## 2024-02-27 RX ORDER — MIRTAZAPINE 15 MG/1
15 TABLET, FILM COATED ORAL NIGHTLY
Status: DISCONTINUED | OUTPATIENT
Start: 2024-02-27 | End: 2024-02-29 | Stop reason: HOSPADM

## 2024-02-27 RX ADMIN — FUROSEMIDE 40 MG: 10 INJECTION, SOLUTION INTRAMUSCULAR; INTRAVENOUS at 15:58

## 2024-02-27 RX ADMIN — MELATONIN 3 MG: 3 TAB ORAL at 20:35

## 2024-02-27 RX ADMIN — METOPROLOL TARTRATE 50 MG: 50 TABLET, FILM COATED ORAL at 20:35

## 2024-02-27 RX ADMIN — WARFARIN SODIUM 7.5 MG: 7.5 TABLET ORAL at 17:58

## 2024-02-27 RX ADMIN — MIRTAZAPINE 15 MG: 15 TABLET, FILM COATED ORAL at 20:35

## 2024-02-27 RX ADMIN — GABAPENTIN 600 MG: 300 CAPSULE ORAL at 20:35

## 2024-02-27 RX ADMIN — ACETAMINOPHEN 650 MG: 325 TABLET ORAL at 20:39

## 2024-02-27 RX ADMIN — NYSTATIN 1 APPLICATION: 100000 POWDER TOPICAL at 20:36

## 2024-02-27 RX ADMIN — ERTAPENEM SODIUM 1 G: 1 INJECTION, POWDER, LYOPHILIZED, FOR SOLUTION INTRAMUSCULAR; INTRAVENOUS at 15:22

## 2024-02-27 RX ADMIN — ATORVASTATIN CALCIUM 20 MG: 20 TABLET, FILM COATED ORAL at 20:36

## 2024-02-27 SDOH — SOCIAL STABILITY: SOCIAL INSECURITY: DO YOU FEEL ANYONE HAS EXPLOITED OR TAKEN ADVANTAGE OF YOU FINANCIALLY OR OF YOUR PERSONAL PROPERTY?: NO

## 2024-02-27 SDOH — SOCIAL STABILITY: SOCIAL INSECURITY: ARE YOU OR HAVE YOU BEEN THREATENED OR ABUSED PHYSICALLY, EMOTIONALLY, OR SEXUALLY BY ANYONE?: NO

## 2024-02-27 SDOH — SOCIAL STABILITY: SOCIAL INSECURITY: HAS ANYONE EVER THREATENED TO HURT YOUR FAMILY OR YOUR PETS?: NO

## 2024-02-27 SDOH — SOCIAL STABILITY: SOCIAL INSECURITY: HAVE YOU HAD THOUGHTS OF HARMING ANYONE ELSE?: NO

## 2024-02-27 SDOH — SOCIAL STABILITY: SOCIAL INSECURITY: WERE YOU ABLE TO COMPLETE ALL THE BEHAVIORAL HEALTH SCREENINGS?: YES

## 2024-02-27 SDOH — SOCIAL STABILITY: SOCIAL INSECURITY: ABUSE: ADULT

## 2024-02-27 SDOH — SOCIAL STABILITY: SOCIAL INSECURITY: ARE THERE ANY APPARENT SIGNS OF INJURIES/BEHAVIORS THAT COULD BE RELATED TO ABUSE/NEGLECT?: NO

## 2024-02-27 SDOH — SOCIAL STABILITY: SOCIAL INSECURITY: DO YOU FEEL UNSAFE GOING BACK TO THE PLACE WHERE YOU ARE LIVING?: NO

## 2024-02-27 SDOH — SOCIAL STABILITY: SOCIAL INSECURITY: DOES ANYONE TRY TO KEEP YOU FROM HAVING/CONTACTING OTHER FRIENDS OR DOING THINGS OUTSIDE YOUR HOME?: NO

## 2024-02-27 ASSESSMENT — PAIN DESCRIPTION - LOCATION: LOCATION: LEG

## 2024-02-27 ASSESSMENT — COGNITIVE AND FUNCTIONAL STATUS - GENERAL
TOILETING: A LITTLE
CLIMB 3 TO 5 STEPS WITH RAILING: A LOT
DAILY ACTIVITIY SCORE: 20
MOVING TO AND FROM BED TO CHAIR: A LITTLE
MOBILITY SCORE: 18
STANDING UP FROM CHAIR USING ARMS: A LITTLE
DRESSING REGULAR UPPER BODY CLOTHING: A LITTLE
PATIENT BASELINE BEDBOUND: NO
DRESSING REGULAR LOWER BODY CLOTHING: A LITTLE
HELP NEEDED FOR BATHING: A LITTLE
TURNING FROM BACK TO SIDE WHILE IN FLAT BAD: A LITTLE
WALKING IN HOSPITAL ROOM: A LITTLE

## 2024-02-27 ASSESSMENT — LIFESTYLE VARIABLES
HOW MANY STANDARD DRINKS CONTAINING ALCOHOL DO YOU HAVE ON A TYPICAL DAY: PATIENT DOES NOT DRINK
AUDIT-C TOTAL SCORE: 0
HOW OFTEN DO YOU HAVE A DRINK CONTAINING ALCOHOL: NEVER
HOW OFTEN DO YOU HAVE 6 OR MORE DRINKS ON ONE OCCASION: NEVER
SKIP TO QUESTIONS 9-10: 1
AUDIT-C TOTAL SCORE: 0

## 2024-02-27 ASSESSMENT — COLUMBIA-SUICIDE SEVERITY RATING SCALE - C-SSRS
6. HAVE YOU EVER DONE ANYTHING, STARTED TO DO ANYTHING, OR PREPARED TO DO ANYTHING TO END YOUR LIFE?: NO
1. IN THE PAST MONTH, HAVE YOU WISHED YOU WERE DEAD OR WISHED YOU COULD GO TO SLEEP AND NOT WAKE UP?: NO
2. HAVE YOU ACTUALLY HAD ANY THOUGHTS OF KILLING YOURSELF?: NO

## 2024-02-27 ASSESSMENT — PAIN SCALES - GENERAL
PAINLEVEL_OUTOF10: 8
PAINLEVEL_OUTOF10: 0 - NO PAIN
PAINLEVEL_OUTOF10: 0 - NO PAIN

## 2024-02-27 ASSESSMENT — ACTIVITIES OF DAILY LIVING (ADL)
ADEQUATE_TO_COMPLETE_ADL: YES
GROOMING: NEEDS ASSISTANCE
DRESSING YOURSELF: NEEDS ASSISTANCE
JUDGMENT_ADEQUATE_SAFELY_COMPLETE_DAILY_ACTIVITIES: YES
FEEDING YOURSELF: INDEPENDENT
TOILETING: NEEDS ASSISTANCE
HEARING - LEFT EAR: DIFFICULTY WITH NOISE
BATHING: NEEDS ASSISTANCE
ASSISTIVE_DEVICE: WALKER;EYEGLASSES;WHEELCHAIR
PATIENT'S MEMORY ADEQUATE TO SAFELY COMPLETE DAILY ACTIVITIES?: YES
LACK_OF_TRANSPORTATION: NO
WALKS IN HOME: NEEDS ASSISTANCE
HEARING - RIGHT EAR: DIFFICULTY WITH NOISE

## 2024-02-27 ASSESSMENT — PAIN - FUNCTIONAL ASSESSMENT
PAIN_FUNCTIONAL_ASSESSMENT: 0-10
PAIN_FUNCTIONAL_ASSESSMENT: 0-10

## 2024-02-27 ASSESSMENT — PAIN DESCRIPTION - ORIENTATION: ORIENTATION: LEFT

## 2024-02-27 ASSESSMENT — PATIENT HEALTH QUESTIONNAIRE - PHQ9
2. FEELING DOWN, DEPRESSED OR HOPELESS: NOT AT ALL
SUM OF ALL RESPONSES TO PHQ9 QUESTIONS 1 & 2: 0
1. LITTLE INTEREST OR PLEASURE IN DOING THINGS: NOT AT ALL

## 2024-02-27 NOTE — PROGRESS NOTES
Pharmacy to dose warfarin    Today's INR / PT - 1.4  INR Goal - 2-3  TTR last 90 days - N/A  Related labs - H&H - 9.9 / 33.6  Daily PT / INR check - Yes  Last INR prior to admission - 2/22/24 - unreadable; 2/3/24 - 3.7  Primary / Secondary Diagnosis - Afib  [facility] patient - No  Ordering Provider - Rodrick Jj  Plan - INR subtherapeutic (1.4) - will give warfarin 7.5 mg today.  Repeat PT/INR in AM and reassess dosing.  Order was placed daily PT/INR check with AM draws.

## 2024-02-27 NOTE — CONSULTS
Inpatient consult to Cardiology  Consult performed by: Janes Phelan MD  Consult ordered by: Rodrick Jj DO  Reason for consult: CHF / aortic stenosis      History Of Present Illness:    Mrs. Mayte Pulliam is a 88 y.o. everyday smoker diabetic female being consulted by the Cardiology team for CHF / aortic stenosis. Patient with past medical history significant for COPD, Hypertension, Diabetes, Hyperlipidemia, Peripheral Edema with Volume Overload, Diastolic congestive heart failure with preserved ejection fraction, Moderate aortic stenosis, Mitral valve regurgitation, DVT on Warfarin, Anxiety, Depression, GERD, malnutrition and osteoarthritis. Notably, patient had been admitted on February 22nd 2024 for peripheral edema she was diuresed and had her legs wrapped. She had some renal insufficiency by that time. She presented to ED [facility] on 02/27/2024 sent from Nephrology office (Dr. Benavidez) due to volume overload, Erythema and Edema of lower extremities bilaterally, UTI with E. coli and Klebsiella that is showing multi resistance. Patient endorses leg edema for the past months. She denies chest pain, lightheadedness, headaches, fever, chills, orthopnea, paroxysmal nocturnal dyspnea or syncope.   EKG shows normal sinus rhythm with no signs of ACS.   Echo (02/22/2024):   1. Left ventricular systolic function is normal with a 60-65% estimated ejection fraction.   2. Spectral Doppler shows an impaired relaxation pattern of left ventricular diastolic filling.   3. Mild mitral valve regurgitation.    4. Moderate aortic valve stenosis. The peak instantaneous gradient of the aortic valve is 32.5 mmHg. The mean gradient of the aortic valve is 21.0 mmHg. Mean transaortic gradient 21 mmHg, peak velocity 2.8 m/s, dimensionless index 0.4.        Last Recorded Vitals:  Vitals:    02/27/24 1221 02/27/24 1500   BP: (!) 194/76 144/72   Pulse: 56 60   Resp: 18    Temp: 36.6 °C (97.8 °F) 36.6 °C (97.9 °F)   SpO2:  94% 94%       Last Labs:  CBC - 2/27/2024: 12:59 PM  8.9 9.9 408    33.6      CMP - 2/27/2024:  1:00 PM  9.0 6.3 12 --- 0.2   3.5 3.4 10 99      PTT - 2/27/2024:  1:00 PM  1.4   15.7 31     Troponin I, High Sensitivity   Date/Time Value Ref Range Status   02/02/2024 05:13 PM 4 0 - 13 ng/L Final     BNP   Date/Time Value Ref Range Status   02/27/2024 01:00  (H) 0 - 99 pg/mL Final   02/22/2024 10:59  (H) 0 - 99 pg/mL Final      Last I/O:  No intake/output data recorded.    Past Cardiology Tests (Last 3 Years):  EKG:  ECG 12 lead 02/22/2024      ECG 12 lead 02/02/2024    Echo:  Transthoracic Echo (TTE) Complete 02/22/2024    Ejection Fractions:  EF   Date/Time Value Ref Range Status   02/22/2024 02:49 PM 58 %      Cath:  No results found for this or any previous visit from the past 1095 days.    Stress Test:  No results found for this or any previous visit from the past 1095 days.    Cardiac Imaging:  No results found for this or any previous visit from the past 1095 days.      Past Medical History:  She has a past medical history of Anxiety, COPD (chronic obstructive pulmonary disease) (CMS/[facility]), COVID-19, Depression, Depression, Diabetes mellitus (CMS/[facility]), GERD (gastroesophageal reflux disease), Hyperlipidemia, Hypertension, Hypomagnesemia, Malnutrition (CMS/[facility]), and Osteoarthritis.    Past Surgical History:  She has no past surgical history on file.      Social History:  She reports that she has been smoking cigarettes. She has never used smokeless tobacco. She reports that she does not currently use alcohol. She reports that she does not use drugs.    Family History:  No family history on file.     Allergies:  Patient has no known allergies.    Inpatient Medications:  Scheduled medications   Medication Dose Route Frequency    atorvastatin  20 mg oral Nightly    ertapenem  1 g intravenous q24h    escitalopram  10 mg oral q AM    furosemide  40 mg intravenous q12h    [Held by provider] furosemide  40 mg  oral Daily    gabapentin  300 mg oral q AM    gabapentin  600 mg oral Nightly    melatonin  3 mg oral Daily    metoprolol tartrate  50 mg oral BID    mirtazapine  15 mg oral Nightly    nystatin  1 Application Topical BID    pantoprazole  40 mg oral Daily    warfarin  7.5 mg oral Once     PRN medications   Medication    acetaminophen    ondansetron ODT     Continuous Medications   Medication Dose Last Rate     Outpatient Medications:  Current Outpatient Medications   Medication Instructions    acetaminophen (TYLENOL) 650 mg, oral, Every 6 hours PRN    acetaminophen (TYLENOL) 650 mg, oral, Nightly    atorvastatin (LIPITOR) 20 mg, oral, Nightly    cephalexin (KEFLEX) 500 mg, oral, 4 times daily    cyclobenzaprine (FLEXERIL) 10 mg, oral, Daily PRN    escitalopram (LEXAPRO) 10 mg, oral, Every morning    furosemide (LASIX) 40 mg, oral, Daily    gabapentin (NEURONTIN) 300 mg, oral, Every morning    gabapentin (NEURONTIN) 600 mg, oral, Nightly    loperamide (IMODIUM) 2 mg, oral, 4 times daily PRN    magnesium oxide (MAG-OX) 400 mg, 2 times daily    menthol (Biofreeze, menthol,) 4 % gel gel 1 Application, Topical, 3 times daily PRN    metFORMIN XR (GLUCOPHAGE-XR) 500 mg, oral, Daily, Do not crush, chew, or split.    metoprolol tartrate (LOPRESSOR) 50 mg, oral, 2 times daily    mirtazapine (REMERON) 15 mg, oral, Nightly    multivitamin tablet 1 tablet, oral, Daily    mupirocin (Bactroban) 2 % ointment 1 Application, Topical, 3 times daily    ondansetron (ZOFRAN) 4 mg, oral, Every 4 hours PRN    pantoprazole (PROTONIX) 40 mg, oral, Daily, Do not crush, chew, or split.    warfarin (COUMADIN) 5 mg, oral, Daily, Take as directed per After Visit Summary.       Physical Exam:  General: alert, oriented and in no acute distress  HEENT: NC/AT; EOMI; PERRLA, external ear is normal  Neck: supple; trachea midline; no masses; no JVD  Chest: clear breath sounds bilaterally; no wheezing  Cardio: regular rhythm, S1S2 normal, no  "murmurs  Abdomen: Soft, non-tender, non-distension, no organomegaly  Extremities: Edema 2-3+/3+ LE b/l with signs of cellulitis  Neuro: Grossly intact     Psychiatric: Normal mood and affect      Assessment/Plan     Mrs. Mayte Pulliam is a 88 y.o. everyday smoker diabetic female being consulted by the Cardiology team for CHF / aortic stenosis. Patient with past medical history significant for COPD, Hypertension, Diabetes, Hyperlipidemia, Peripheral Edema with Volume Overload, Diastolic congestive heart failure with preserved ejection fraction, Moderate aortic stenosis, Mitral valve regurgitation, DVT on Warfarin, Anxiety, Depression, GERD, malnutrition and osteoarthritis. Notably, patient had been admitted on February 02nd 2024 for peripheral edema she was diuresed and had her legs wrapped. She had some renal insufficiency by that time. She presented to ED [facility] on 02/27/2024 sent from Nephrology office (Dr. Benavidez) due to volume overload, Erythema and Edema of lower extremities bilaterally, UTI with E. coli and Klebsiella that is showing multi resistance. Patient endorses leg edema for the past months. She denies chest pain, lightheadedness, headaches, fever, chills, orthopnea, paroxysmal nocturnal dyspnea or syncope. Patient admitted for diuresis, antibiotics and clinical compensation.    Assessment    # HFpEF - LVEF 60-65%  -   - Crea 0.42  - Patient looks \"warm and wet\"  - Patient continues to be volume overloaded based on her clinical presentation. We suggest to keep diuresing.  - Keep daily weights. Patient's baseline weight is 160 lb.  - Low sodium diet (2g).  - 1500mL fluid restriction.  - Strict I&Os.  - Electrolyte control and daily BMP including magnesium.  - General recommendations for heart failure, including low-sodium diet, fluid restriction, daily weight and adherence to medication.   - Keep Warfarin.  - Keep Metoprolol tartrate 50mg BID.  - Keep Furosemide 40mg IV BID.  - Please refer " the patient to Cardiology Clinic upon discharge.     # Moderate Aortic Stenosis  - EKG shows normal sinus rhythm with no signs of ACS.   - Echo (02/22/2024):   1. Left ventricular systolic function is normal with a 60-65% estimated ejection fraction.   2. Spectral Doppler shows an impaired relaxation pattern of left ventricular diastolic filling.   3. Mild mitral valve regurgitation.    4. Moderate aortic valve stenosis. The peak instantaneous gradient of the aortic valve is 32.5 mmHg. The mean gradient of the aortic valve is 21.0 mmHg. Mean transaortic gradient 21 mmHg, peak velocity 2.8 m/s, dimensionless index 0.4.   - Due to low-mod gradients, would suggest to keep conservative treatment at this point.  -  Keep Warfarin, statin    # Cellulitis / UTI  - Keep broad spectrum antibiotics  - Contact preventions    # Hypertension  - Controlled blood pressure.  - Keep current medications with Metoprolol.  - Patient counseled to keep a healthy lifestyle including regular exercise and low-sodium diet.  - Recommended home blood pressure monitoring.  - Goal of BP < 130/80mmHg.     # Diabetes  - Counseled on healthy diet and regular exercises.  - Discussed need for weight loss and the benefits.   - Keep current medications including Metformin.  - ISS.     # Hyperlipidemia  - Controlled by PCP.  - Keep home medication with Atorvastatin 40mg daily.  - Counseled on healthy diet and regular exercise.     Thank you for allowing me to participate in the care of this patient. Please reach me out if you have any questions or if you need any clarifications regarding the patient's care.     Peripheral IV 02/27/24 22 G Right;Ventral Forearm (Active)   Site Assessment Clean;Dry;Intact 02/27/24 1300   Number of days: 0     Code Status:  Full Code    Janes Phelan MD

## 2024-02-27 NOTE — H&P
"HPI    Mayte Pulliam is a 88 y.o. female who presented to [facility] as a direct admission by Dr. Benavidez due to volume overload 2/2 CHF & complicated UTI. Patient states to be upset that she is admitted to the hospital due to \"hating hospitals so much\" but is open to being treated for the sake of her health. She states to have noticed her legs swelling more than usual since last seen in early February. Does not know how much she usually weighs, denies increased shortness of breath when lying flat on back, palpitations, chest pain, and change in diet. Currently does not have any urinary symptoms, fever, chills, and has not had any recent injury to legs.     12 Point ROS negative unless noted in above HPI    ED Course   Vitals - /72   Pulse 60   Temp 36.6 °C (97.9 °F)   Resp 18   Wt 78.2 kg (172 lb 6.4 oz)   SpO2 94%   Labs -   Results for orders placed or performed during the hospital encounter of 02/27/24 (from the past 24 hour(s))   CBC   Result Value Ref Range    WBC 8.9 4.4 - 11.3 x10*3/uL    nRBC 0.0 0.0 - 0.0 /100 WBCs    RBC 4.14 4.00 - 5.20 x10*6/uL    Hemoglobin 9.9 (L) 12.0 - 16.0 g/dL    Hematocrit 33.6 (L) 36.0 - 46.0 %    MCV 81 80 - 100 fL    MCH 23.9 (L) 26.0 - 34.0 pg    MCHC 29.5 (L) 32.0 - 36.0 g/dL    RDW 20.1 (H) 11.5 - 14.5 %    Platelets 408 150 - 450 x10*3/uL   Lactate   Result Value Ref Range    Lactate 1.4 0.4 - 2.0 mmol/L   Renal function panel   Result Value Ref Range    Glucose 110 (H) 74 - 99 mg/dL    Sodium 141 136 - 145 mmol/L    Potassium 3.9 3.5 - 5.3 mmol/L    Chloride 103 98 - 107 mmol/L    Bicarbonate 31 21 - 32 mmol/L    Anion Gap 11 10 - 20 mmol/L    Urea Nitrogen 7 6 - 23 mg/dL    Creatinine 0.42 (L) 0.50 - 1.05 mg/dL    eGFR >90 >60 mL/min/1.73m*2    Calcium 9.0 8.6 - 10.3 mg/dL    Phosphorus 3.5 2.5 - 4.9 mg/dL    Albumin 3.4 3.4 - 5.0 g/dL   Magnesium   Result Value Ref Range    Magnesium 1.67 1.60 - 2.40 mg/dL   Coagulation Screen   Result Value Ref Range    " Protime 15.7 (H) 9.8 - 12.8 seconds    INR 1.4 (H) 0.9 - 1.1    aPTT 31 27 - 38 seconds   B-type natriuretic peptide   Result Value Ref Range     (H) 0 - 99 pg/mL     Interventions -   Medications   atorvastatin (Lipitor) tablet 20 mg (has no administration in time range)   furosemide (Lasix) tablet 40 mg ( oral Dose Auto Held 3/2/24 0900)   escitalopram (Lexapro) tablet 10 mg (10 mg oral Not Given 2/27/24 1230)   gabapentin (Neurontin) capsule 300 mg (300 mg oral Not Given 2/27/24 1230)   gabapentin (Neurontin) capsule 600 mg (has no administration in time range)   metoprolol tartrate (Lopressor) tablet 50 mg (50 mg oral Not Given 2/27/24 1230)   mirtazapine (Remeron) tablet 15 mg (has no administration in time range)   pantoprazole (ProtoNix) EC tablet 40 mg (40 mg oral Not Given 2/27/24 1230)   acetaminophen (Tylenol) tablet 650 mg (has no administration in time range)   ondansetron ODT (Zofran-ODT) disintegrating tablet 4 mg (has no administration in time range)   melatonin tablet 3 mg (has no administration in time range)   ertapenem (INVanz) in sodium chloride 0.9 % 50 mL IV 1 g (1 g intravenous New Bag 2/27/24 1522)   warfarin (Coumadin) tablet 7.5 mg (has no administration in time range)   furosemide (Lasix) injection 40 mg (40 mg intravenous Given 2/27/24 1558)   nystatin (Mycostatin) 100,000 unit/gram powder 1 Application (has no administration in time range)       Past Medical History     Past Medical History:   Diagnosis Date    Anxiety     COPD (chronic obstructive pulmonary disease) (CMS/HCC)     COVID-19     Depression     Depression     Diabetes mellitus (CMS/HCC)     GERD (gastroesophageal reflux disease)     Hyperlipidemia     Hypertension     Hypomagnesemia     Malnutrition (CMS/HCC)     Osteoarthritis         Surgical History   History reviewed. No pertinent surgical history.    Family History   No family history on file.    Social History   She reports that she has been smoking  "cigarettes. She has never used smokeless tobacco. She reports that she does not currently use alcohol. She reports that she does not use drugs.    Allergies   Patient has no known allergies.    Medications     Current Facility-Administered Medications:     acetaminophen (Tylenol) tablet 650 mg, 650 mg, oral, q4h PRN, Rodrick Jj DO    atorvastatin (Lipitor) tablet 20 mg, 20 mg, oral, Nightly, Rodrick Jj DO    ertapenem (INVanz) in sodium chloride 0.9 % 50 mL IV 1 g, 1 g, intravenous, q24h, Rodrick Jj DO, 1 g at 02/27/24 1522    escitalopram (Lexapro) tablet 10 mg, 10 mg, oral, q AM, Rodrick Jj DO    furosemide (Lasix) injection 40 mg, 40 mg, intravenous, q12h, Rodrick Jj DO, 40 mg at 02/27/24 1558    [Held by provider] furosemide (Lasix) tablet 40 mg, 40 mg, oral, Daily, Rodrick Jj DO    gabapentin (Neurontin) capsule 300 mg, 300 mg, oral, q AM, Rodrick Jj DO    gabapentin (Neurontin) capsule 600 mg, 600 mg, oral, Nightly, Rodrick Jj DO    melatonin tablet 3 mg, 3 mg, oral, Daily, Rodrick Jj DO    metoprolol tartrate (Lopressor) tablet 50 mg, 50 mg, oral, BID, Rodrick Jj DO    mirtazapine (Remeron) tablet 15 mg, 15 mg, oral, Nightly, Rodrick Jj DO    nystatin (Mycostatin) 100,000 unit/gram powder 1 Application, 1 Application, Topical, BID, Rodrick Jj DO    ondansetron ODT (Zofran-ODT) disintegrating tablet 4 mg, 4 mg, oral, q8h PRN, Rodrick Jj DO    pantoprazole (ProtoNix) EC tablet 40 mg, 40 mg, oral, Daily, Rodrick Jj DO    warfarin (Coumadin) tablet 7.5 mg, 7.5 mg, oral, Once, Rodrick Jj DO    Objective   Vital Signs:  Blood pressure 144/72, pulse 60, temperature 36.6 °C (97.9 °F), resp. rate 18, height 1.664 m (5' 5.5\"), weight 78.2 kg (172 lb 6.4 oz), SpO2 94 %.    Physical Exam  Constitutional:       General: She is not in acute distress.     Appearance: Normal appearance. She is not ill-appearing.   HENT:      Head: Normocephalic and " atraumatic.      Mouth/Throat:      Mouth: Mucous membranes are moist.   Eyes:      Extraocular Movements: Extraocular movements intact.      Conjunctiva/sclera: Conjunctivae normal.   Cardiovascular:      Rate and Rhythm: Normal rate and regular rhythm.      Pulses: Normal pulses.      Heart sounds: Normal heart sounds, S1 normal and S2 normal.   Pulmonary:      Effort: Pulmonary effort is normal.      Breath sounds: Normal breath sounds.   Abdominal:      General: Abdomen is flat. Bowel sounds are normal.      Palpations: Abdomen is soft.   Musculoskeletal:      Right lower leg: 3+ Pitting Edema present.      Left lower leg: 3+ Pitting Edema present.   Skin:     General: Skin is warm.      Capillary Refill: Capillary refill takes less than 2 seconds.      Findings: Erythema and wound present.      Comments: BLE erythema   Neurological:      General: No focal deficit present.      Mental Status: She is alert. Mental status is at baseline.   Psychiatric:         Mood and Affect: Mood normal.         Behavior: Behavior normal.         Thought Content: Thought content normal.         Judgment: Judgment normal.         Wt Readings from Last 6 Encounters:   02/27/24 78.2 kg (172 lb 6.4 oz)   02/27/24 72.6 kg (160 lb)   02/22/24 72.7 kg (160 lb 3.2 oz)   02/02/24 75.6 kg (166 lb 10.7 oz)       I/Os:    Intake/Output Summary (Last 24 hours) at 2/27/2024 1641  Last data filed at 2/27/2024 1522  Gross per 24 hour   Intake 100 ml   Output --   Net 100 ml       Labs:   Results for orders placed or performed during the hospital encounter of 02/27/24 (from the past 24 hour(s))   CBC   Result Value Ref Range    WBC 8.9 4.4 - 11.3 x10*3/uL    nRBC 0.0 0.0 - 0.0 /100 WBCs    RBC 4.14 4.00 - 5.20 x10*6/uL    Hemoglobin 9.9 (L) 12.0 - 16.0 g/dL    Hematocrit 33.6 (L) 36.0 - 46.0 %    MCV 81 80 - 100 fL    MCH 23.9 (L) 26.0 - 34.0 pg    MCHC 29.5 (L) 32.0 - 36.0 g/dL    RDW 20.1 (H) 11.5 - 14.5 %    Platelets 408 150 - 450 x10*3/uL    Lactate   Result Value Ref Range    Lactate 1.4 0.4 - 2.0 mmol/L   Renal function panel   Result Value Ref Range    Glucose 110 (H) 74 - 99 mg/dL    Sodium 141 136 - 145 mmol/L    Potassium 3.9 3.5 - 5.3 mmol/L    Chloride 103 98 - 107 mmol/L    Bicarbonate 31 21 - 32 mmol/L    Anion Gap 11 10 - 20 mmol/L    Urea Nitrogen 7 6 - 23 mg/dL    Creatinine 0.42 (L) 0.50 - 1.05 mg/dL    eGFR >90 >60 mL/min/1.73m*2    Calcium 9.0 8.6 - 10.3 mg/dL    Phosphorus 3.5 2.5 - 4.9 mg/dL    Albumin 3.4 3.4 - 5.0 g/dL   Magnesium   Result Value Ref Range    Magnesium 1.67 1.60 - 2.40 mg/dL   Coagulation Screen   Result Value Ref Range    Protime 15.7 (H) 9.8 - 12.8 seconds    INR 1.4 (H) 0.9 - 1.1    aPTT 31 27 - 38 seconds   B-type natriuretic peptide   Result Value Ref Range     (H) 0 - 99 pg/mL       Imaging:  No results found.    Assessment & Plan    Mayte Pulliam is a 88 y.o. female on day 0 of admission to [facility] for management of complicated UTI, acute decompensated diastolic heart failure c/b volume overload, and venous stasis dermatitis.    Acute on chronic HFpEF  - Most recent TTE reveals EF 60-65% & moderate aortic stenosis  - 3+ edema of BLE  - Start IV Lasix 40 mg BID, strict I/Os, daily weight, Cardiac Diet, fluid restriction per cardiology team  - Continue metoprolol tartrate 50 mg BID  - Once euvolemic will add GDMT    Subtherapeutic INR, Hx of DVT  - INR 1.4, trend daily  - Dosing of warfarin per pharmacy team    Complicated UTI  Venous stasis dermatitis  Candidal Intertrigo  - Start IV Ertapenem based on previous cultures  - Continue lymphedema compression wrapping for BLE  - Start Nystatin powder for groin folds    Disposition: Anticipate LOS > 2 midnights for above medical issues.    I spent a total of 60 minutes on the date of the service which included preparing to see the patient, face-to-face patient care, completing clinical documentation, obtaining and/or reviewing separately obtained  history, performing a medically appropriate examination, counseling and educating the patient/family/caregiver, ordering medications, tests, or procedures, independently interpreting results (not separately reported), and communicating results to the patient/family/caregiver.    Rodrick Jj, DO  Internal Medicine

## 2024-02-27 NOTE — PROGRESS NOTES
Subjective   She is feeling pretty well  She has swelling  Legs are better she says they are wrapped  Feels urge to urinate often    Patient ID: Mayte Pulliam is a 88 y.o. female who presents for Follow-up (Here to establish/ED Visit 2/22).  HPI  She is here for follow-up.  I met her in the hospital and she had a lot of edema she was discharged on new diuretics  She presented back to the emergency room and did not want to stay and so I saw her in the emergency room and work to get her back home  She has not had labs redrawn since she was in the ER.  In the emergency room she was found to have a urinary tract infection with E. coli and Klebsiella.  The infection is quite resistant it is only susceptible to Zosyn meropenem and ertapenem  Otherwise she has not had a blood work redrawn  Her medications are reviewed she is currently on a statin she was sent home on Keflex but the UTI is definitely resistant to Keflex  She is on Lasix 40 mg she is on gabapentin magnesium metformin metoprolol a PPI and Coumadin  Review of Systems   Cardiovascular:  Positive for leg swelling.   All other systems reviewed and are negative.      Objective   Physical Exam  Vitals reviewed.   Constitutional:       Appearance: Normal appearance.   HENT:      Head: Normocephalic and atraumatic.      Right Ear: External ear normal.      Left Ear: External ear normal.      Nose: Nose normal.      Mouth/Throat:      Mouth: Mucous membranes are moist.      Pharynx: Oropharynx is clear.   Eyes:      Extraocular Movements: Extraocular movements intact.      Conjunctiva/sclera: Conjunctivae normal.      Pupils: Pupils are equal, round, and reactive to light.   Cardiovascular:      Rate and Rhythm: Normal rate and regular rhythm.   Pulmonary:      Effort: Pulmonary effort is normal.      Breath sounds: Normal breath sounds.   Abdominal:      General: Abdomen is flat.      Palpations: Abdomen is soft.   Musculoskeletal:         General: Swelling  present.      Right lower leg: Edema present.      Left lower leg: Edema present.      Comments: Legs wrapped to the knees     Skin:     General: Skin is warm and dry.   Neurological:      General: No focal deficit present.      Mental Status: She is alert and oriented to person, place, and time.   Psychiatric:         Mood and Affect: Mood normal.         Behavior: Behavior normal.       Assessment/Plan   Problem List Items Addressed This Visit             ICD-10-CM    Diastolic CHF (CMS/HCC) - Primary I50.30    UTI (urinary tract infection) N39.0    Edema R60.9   She has edema that is present all the way up to her waist  Her urinary tract infection came back as resistant to everything except meropenem, ertapenem, Zosyn  She is having frequency and recently was septic from her urinary tract infection  Her echo also shows moderate aortic stenosis and may be contributing to her CHF and edema issues  She needs to see cardiology  We are unable to get antibiotics for her as an outpatient  Due to all of these reasons I am going to work to get her admitted to start therapy so that we can get her started and then transition back to outpatient therapy       Peripheral Edema with Volume Overload  Diastolic congestive heart failure with preserved ejection fraction  Moderate aortic stenosis  Mitral valve regurgitation  Erythema of B/L LE  UTI with E. coli and Klebsiella that is showing multi resistance    Yair Benavidez DO 02/27/24 10:01 AM

## 2024-02-28 LAB
ALBUMIN SERPL BCP-MCNC: 3.6 G/DL (ref 3.4–5)
ANION GAP SERPL CALC-SCNC: 13 MMOL/L (ref 10–20)
APTT PPP: 29 SECONDS (ref 27–38)
BUN SERPL-MCNC: 11 MG/DL (ref 6–23)
CALCIUM SERPL-MCNC: 9 MG/DL (ref 8.6–10.3)
CHLORIDE SERPL-SCNC: 102 MMOL/L (ref 98–107)
CO2 SERPL-SCNC: 28 MMOL/L (ref 21–32)
CREAT SERPL-MCNC: 0.42 MG/DL (ref 0.5–1.05)
EGFRCR SERPLBLD CKD-EPI 2021: >90 ML/MIN/1.73M*2
ERYTHROCYTE [DISTWIDTH] IN BLOOD BY AUTOMATED COUNT: 20.2 % (ref 11.5–14.5)
GLUCOSE SERPL-MCNC: 152 MG/DL (ref 74–99)
HCT VFR BLD AUTO: 35.3 % (ref 36–46)
HGB BLD-MCNC: 10.5 G/DL (ref 12–16)
INR PPP: 1.3 (ref 0.9–1.1)
MAGNESIUM SERPL-MCNC: 1.69 MG/DL (ref 1.6–2.4)
MCH RBC QN AUTO: 24 PG (ref 26–34)
MCHC RBC AUTO-ENTMCNC: 29.7 G/DL (ref 32–36)
MCV RBC AUTO: 81 FL (ref 80–100)
NRBC BLD-RTO: 0 /100 WBCS (ref 0–0)
PHOSPHATE SERPL-MCNC: 4.3 MG/DL (ref 2.5–4.9)
PLATELET # BLD AUTO: 435 X10*3/UL (ref 150–450)
POTASSIUM SERPL-SCNC: 3.5 MMOL/L (ref 3.5–5.3)
PROTHROMBIN TIME: 14.4 SECONDS (ref 9.8–12.8)
RBC # BLD AUTO: 4.37 X10*6/UL (ref 4–5.2)
SODIUM SERPL-SCNC: 139 MMOL/L (ref 136–145)
WBC # BLD AUTO: 11.5 X10*3/UL (ref 4.4–11.3)

## 2024-02-28 PROCEDURE — 2500000002 HC RX 250 W HCPCS SELF ADMINISTERED DRUGS (ALT 637 FOR MEDICARE OP, ALT 636 FOR OP/ED): Performed by: STUDENT IN AN ORGANIZED HEALTH CARE EDUCATION/TRAINING PROGRAM

## 2024-02-28 PROCEDURE — 80069 RENAL FUNCTION PANEL: CPT | Performed by: STUDENT IN AN ORGANIZED HEALTH CARE EDUCATION/TRAINING PROGRAM

## 2024-02-28 PROCEDURE — 99232 SBSQ HOSP IP/OBS MODERATE 35: CPT | Performed by: STUDENT IN AN ORGANIZED HEALTH CARE EDUCATION/TRAINING PROGRAM

## 2024-02-28 PROCEDURE — 85730 THROMBOPLASTIN TIME PARTIAL: CPT | Performed by: STUDENT IN AN ORGANIZED HEALTH CARE EDUCATION/TRAINING PROGRAM

## 2024-02-28 PROCEDURE — 94664 DEMO&/EVAL PT USE INHALER: CPT

## 2024-02-28 PROCEDURE — 99232 SBSQ HOSP IP/OBS MODERATE 35: CPT

## 2024-02-28 PROCEDURE — 2500000005 HC RX 250 GENERAL PHARMACY W/O HCPCS: Performed by: STUDENT IN AN ORGANIZED HEALTH CARE EDUCATION/TRAINING PROGRAM

## 2024-02-28 PROCEDURE — 85027 COMPLETE CBC AUTOMATED: CPT | Performed by: STUDENT IN AN ORGANIZED HEALTH CARE EDUCATION/TRAINING PROGRAM

## 2024-02-28 PROCEDURE — 83735 ASSAY OF MAGNESIUM: CPT | Performed by: STUDENT IN AN ORGANIZED HEALTH CARE EDUCATION/TRAINING PROGRAM

## 2024-02-28 PROCEDURE — 2500000002 HC RX 250 W HCPCS SELF ADMINISTERED DRUGS (ALT 637 FOR MEDICARE OP, ALT 636 FOR OP/ED)

## 2024-02-28 PROCEDURE — 36415 COLL VENOUS BLD VENIPUNCTURE: CPT | Performed by: STUDENT IN AN ORGANIZED HEALTH CARE EDUCATION/TRAINING PROGRAM

## 2024-02-28 PROCEDURE — 9420000001 HC RT PATIENT EDUCATION 5 MIN

## 2024-02-28 PROCEDURE — 2500000004 HC RX 250 GENERAL PHARMACY W/ HCPCS (ALT 636 FOR OP/ED): Performed by: STUDENT IN AN ORGANIZED HEALTH CARE EDUCATION/TRAINING PROGRAM

## 2024-02-28 PROCEDURE — 94761 N-INVAS EAR/PLS OXIMETRY MLT: CPT

## 2024-02-28 PROCEDURE — 1200000002 HC GENERAL ROOM WITH TELEMETRY DAILY

## 2024-02-28 PROCEDURE — 2500000001 HC RX 250 WO HCPCS SELF ADMINISTERED DRUGS (ALT 637 FOR MEDICARE OP): Performed by: STUDENT IN AN ORGANIZED HEALTH CARE EDUCATION/TRAINING PROGRAM

## 2024-02-28 RX ORDER — EZETIMIBE 10 MG/1
10 TABLET ORAL NIGHTLY
Status: DISCONTINUED | OUTPATIENT
Start: 2024-02-28 | End: 2024-02-29 | Stop reason: HOSPADM

## 2024-02-28 RX ORDER — WARFARIN 7.5 MG/1
7.5 TABLET ORAL ONCE
Status: COMPLETED | OUTPATIENT
Start: 2024-02-28 | End: 2024-02-28

## 2024-02-28 RX ORDER — LANOLIN ALCOHOL/MO/W.PET/CERES
400 CREAM (GRAM) TOPICAL DAILY
Status: DISCONTINUED | OUTPATIENT
Start: 2024-02-28 | End: 2024-02-29 | Stop reason: HOSPADM

## 2024-02-28 RX ORDER — POTASSIUM CHLORIDE 20 MEQ/1
40 TABLET, EXTENDED RELEASE ORAL ONCE
Status: COMPLETED | OUTPATIENT
Start: 2024-02-28 | End: 2024-02-28

## 2024-02-28 RX ADMIN — WARFARIN SODIUM 7.5 MG: 7.5 TABLET ORAL at 18:33

## 2024-02-28 RX ADMIN — FUROSEMIDE 40 MG: 10 INJECTION, SOLUTION INTRAMUSCULAR; INTRAVENOUS at 03:47

## 2024-02-28 RX ADMIN — GABAPENTIN 300 MG: 300 CAPSULE ORAL at 09:14

## 2024-02-28 RX ADMIN — METOPROLOL TARTRATE 50 MG: 50 TABLET, FILM COATED ORAL at 20:16

## 2024-02-28 RX ADMIN — ESCITALOPRAM OXALATE 10 MG: 10 TABLET ORAL at 09:14

## 2024-02-28 RX ADMIN — METOPROLOL TARTRATE 50 MG: 50 TABLET, FILM COATED ORAL at 09:14

## 2024-02-28 RX ADMIN — NYSTATIN 1 APPLICATION: 100000 POWDER TOPICAL at 09:14

## 2024-02-28 RX ADMIN — ERTAPENEM SODIUM 1 G: 1 INJECTION, POWDER, LYOPHILIZED, FOR SOLUTION INTRAMUSCULAR; INTRAVENOUS at 16:03

## 2024-02-28 RX ADMIN — ACETAMINOPHEN 650 MG: 325 TABLET ORAL at 20:15

## 2024-02-28 RX ADMIN — MELATONIN 3 MG: 3 TAB ORAL at 20:16

## 2024-02-28 RX ADMIN — MIRTAZAPINE 15 MG: 15 TABLET, FILM COATED ORAL at 20:15

## 2024-02-28 RX ADMIN — GABAPENTIN 600 MG: 300 CAPSULE ORAL at 20:16

## 2024-02-28 RX ADMIN — ATORVASTATIN CALCIUM 20 MG: 20 TABLET, FILM COATED ORAL at 20:15

## 2024-02-28 RX ADMIN — Medication 2 L/MIN: at 04:01

## 2024-02-28 RX ADMIN — FUROSEMIDE 40 MG: 10 INJECTION, SOLUTION INTRAMUSCULAR; INTRAVENOUS at 14:54

## 2024-02-28 RX ADMIN — Medication 400 MG: at 09:16

## 2024-02-28 RX ADMIN — Medication 2 L/MIN: at 22:44

## 2024-02-28 RX ADMIN — POTASSIUM CHLORIDE 40 MEQ: 1500 TABLET, EXTENDED RELEASE ORAL at 09:14

## 2024-02-28 RX ADMIN — ACETAMINOPHEN 650 MG: 325 TABLET ORAL at 05:29

## 2024-02-28 RX ADMIN — EZETIMIBE 10 MG: 10 TABLET ORAL at 20:16

## 2024-02-28 RX ADMIN — NYSTATIN 1 APPLICATION: 100000 POWDER TOPICAL at 20:15

## 2024-02-28 RX ADMIN — PANTOPRAZOLE SODIUM 40 MG: 40 TABLET, DELAYED RELEASE ORAL at 09:14

## 2024-02-28 ASSESSMENT — PAIN DESCRIPTION - LOCATION
LOCATION: BACK
LOCATION: LEG

## 2024-02-28 ASSESSMENT — COGNITIVE AND FUNCTIONAL STATUS - GENERAL
MOBILITY SCORE: 18
PERSONAL GROOMING: A LITTLE
CLIMB 3 TO 5 STEPS WITH RAILING: A LITTLE
WALKING IN HOSPITAL ROOM: A LITTLE
MOVING TO AND FROM BED TO CHAIR: A LITTLE
TOILETING: A LITTLE
DRESSING REGULAR UPPER BODY CLOTHING: A LITTLE
DAILY ACTIVITIY SCORE: 19
HELP NEEDED FOR BATHING: A LITTLE
MOVING FROM LYING ON BACK TO SITTING ON SIDE OF FLAT BED WITH BEDRAILS: A LITTLE
TURNING FROM BACK TO SIDE WHILE IN FLAT BAD: A LITTLE
DRESSING REGULAR LOWER BODY CLOTHING: A LITTLE
STANDING UP FROM CHAIR USING ARMS: A LITTLE

## 2024-02-28 ASSESSMENT — ACTIVITIES OF DAILY LIVING (ADL): LACK_OF_TRANSPORTATION: NO

## 2024-02-28 ASSESSMENT — PAIN - FUNCTIONAL ASSESSMENT
PAIN_FUNCTIONAL_ASSESSMENT: 0-10

## 2024-02-28 ASSESSMENT — PAIN DESCRIPTION - ORIENTATION: ORIENTATION: LEFT

## 2024-02-28 ASSESSMENT — PAIN SCALES - GENERAL
PAINLEVEL_OUTOF10: 0 - NO PAIN
PAINLEVEL_OUTOF10: 9
PAINLEVEL_OUTOF10: 7
PAINLEVEL_OUTOF10: 3

## 2024-02-28 NOTE — PROGRESS NOTES
Pharmacy Consult for warfarin management.  On warfarin for a fib with INR goal of 2-3.  Warfarin 7.5 mg was given yesterday for INR of 1.4.  Today's INR only 1.3.  Will repeat dose of 7.5 mg today.    Orders already placed for INR draw tomorrow.  Note that patient's normal dose is warfarin 5 mg daily.

## 2024-02-28 NOTE — PROGRESS NOTES
Subjective Data:  Patient examined at bedside and denies any major complaints; patient is ok with being hospitalized for her edema.     Overnight Events:    None     Objective Data:  Last Recorded Vitals:  Vitals:    02/28/24 0350 02/28/24 0401 02/28/24 0700 02/28/24 0814   BP: 177/76  140/57    BP Location: Left arm  Left arm    Patient Position: Lying  Lying    Pulse: 70  58    Resp: 16  16 16   Temp: 36.1 °C (97 °F)  36.1 °C (97 °F)    TempSrc: Temporal  Temporal    SpO2: (!) 85% 93% 96%    Weight:  77.6 kg (171 lb 1.2 oz)     Height:           Last Labs:  CBC - 2/28/2024:  4:22 AM  11.5 10.5 435    35.3      CMP - 2/28/2024:  4:22 AM  9.0 6.3 12 --- 0.2   4.3 3.6 10 99      PTT - 2/28/2024:  4:22 AM  1.3   14.4 29     TROPHS   Date/Time Value Ref Range Status   02/02/2024 05:13 PM 4 0 - 13 ng/L Final     BNP   Date/Time Value Ref Range Status   02/27/2024 01:00  0 - 99 pg/mL Final   02/22/2024 10:59  0 - 99 pg/mL Final      Last I/O:  I/O last 3 completed shifts:  In: 340 (4.4 mL/kg) [P.O.:340]  Out: 1700 (21.9 mL/kg) [Urine:1700 (0.6 mL/kg/hr)]  Weight: 77.6 kg     Past Cardiology Tests (Last 3 Years):  EKG:  ECG 12 lead 02/22/2024      ECG 12 lead 02/02/2024    Echo:  Transthoracic Echo (TTE) Complete 02/22/2024    Ejection Fractions:  EF   Date/Time Value Ref Range Status   02/22/2024 02:49 PM 58 %      Cath:  No results found for this or any previous visit from the past 1095 days.    Stress Test:  No results found for this or any previous visit from the past 1095 days.    Cardiac Imaging:  No results found for this or any previous visit from the past 1095 days.      Inpatient Medications:  Scheduled medications   Medication Dose Route Frequency    atorvastatin  20 mg oral Nightly    ertapenem  1 g intravenous q24h    escitalopram  10 mg oral q AM    furosemide  40 mg intravenous q12h    [Held by provider] furosemide  40 mg oral Daily    gabapentin  300 mg oral q AM    gabapentin  600 mg oral  Nightly    melatonin  3 mg oral Daily    metoprolol tartrate  50 mg oral BID    mirtazapine  15 mg oral Nightly    nystatin  1 Application Topical BID    pantoprazole  40 mg oral Daily    warfarin  7.5 mg oral Once     PRN medications   Medication    acetaminophen    ondansetron ODT    oxygen     Continuous Medications   Medication Dose Last Rate       Physical Exam:  General: awake, alert and oriented. No acute distress.   Skin: Skin is warm, dry and intact without rashes or lesions.   HEENT: normocephalic, atraumatic; conjunctivae are clear without exudates or hemorrhage. Sclera is non-icteric. Eyelids are normal in appearance without swelling or lesions. Hearing intact. Nares are patent bilaterally. Moist mucous membranes.   Cardiovascular: heart rate and rhythm are normal. Grade 4/6 systolic murmur  Respiratory: bilateral lung sounds clear to auscultations without rales, rhonchi, or wheezes. No accessory muscle use or stridor  Gastrointestinal: non-distended, non-tender  Genitourinary: exam deferred  Musculoskeletal: no deformities  Extremities: pulses palpable bilaterally; generalized edema to bilateral legs with wraps in place  Neurological: no focal deficits  Psychiatric: appropriate mood and affect; good judgment and insight       Assessment/Plan     ACC/AHA Stage B HFpEF:  -NYHA Class II  -Patient continues to be fluid overloaded; continue wraps to lower legs; will defer diuretic management to Dr. Benavidez  -Continue daily weights; unclear of baseline as patient resides in a facility   -Direct LDL (April, 2023) 119; will add zetia 10mg to her current regimen   -HbA1c (April, 2023) 6.9%; recommend starting SGLT2i at discharge; please consult with pharmacy on which SGLT2i is preferred based on patient's insurance   -Recommend patient establish care in our office for outpatient follow up  -Will order HFMS at discharge      Aortic stenosis, moderate:  -Echo showing moderate aortic valve stenosis with low-moderate  gradients  -Continue medications as prescribed  -Recommend patient establish care in our office for outpatient follow up   Peripheral IV 02/27/24 22 G Right;Ventral Forearm (Active)   Site Assessment Clean;Dry;Intact 02/28/24 0814   Line Status Saline locked 02/28/24 0814   Dressing Status Clean;Dry 02/28/24 0814   Number of days: 1       Code Status:  Full Code    Thank you for allowing me to participate in the care of this patient. Please reach me out if you have any questions or if you need any clarifications regarding the patient's care.          Shira Ramirez, APRN-CNP, DNP

## 2024-02-28 NOTE — PROGRESS NOTES
"   02/28/24 1205   Discharge Planning   Living Arrangements Other (Comment)   Support Systems Friends/neighbors   Assistance Needed Yes, walker and w/c   Type of Residence Assisted living   Do you have animals or pets at home? No   Care Facility Name [facility]   Who is requesting discharge planning? Provider   Home or Post Acute Services Post acute facilities (Rehab/SNF/etc)   Type of Post Acute Facility Services Assisted living   Patient expects to be discharged to: [facility] the [facility]   Does the patient need discharge transport arranged? Yes   RoundTrip coordination needed? Yes   Has discharge transport been arranged? No   Financial Resource Strain   How hard is it for you to pay for the very basics like food, housing, medical care, and heating? Not very   Housing Stability   In the last 12 months, was there a time when you were not able to pay the mortgage or rent on time? N   In the last 12 months, how many places have you lived? 2   In the last 12 months, was there a time when you did not have a steady place to sleep or slept in a shelter (including now)? N   Transportation Needs   In the past 12 months, has lack of transportation kept you from medical appointments or from getting medications? no   In the past 12 months, has lack of transportation kept you from meetings, work, or from getting things needed for daily living? No   Patient Choice   Provider Choice list and CMS website (https://medicare.gov/care-compare#search) for post-acute Quality and Resource Measure Data were provided and reviewed with: Patient   Patient / Family choosing to utilize agency / facility established prior to hospitalization Yes     Met with pt at the bedside and verified address, phone number and emergency contact information. PCP is Dr Guo and pharmacy is [facility]. Pt has been at [facility] since January previously at [facility] and feels safe.  Pt wants to return to the [facility] when she is " discharged she loves it there. [facility] 20/18. ADOD is 24 hrs. CT to follow. Emma Stevens BSN/RN-TCC

## 2024-02-28 NOTE — CARE PLAN
Problem: Pain - Adult  Goal: Verbalizes/displays adequate comfort level or baseline comfort level  Outcome: Progressing     Problem: Safety - Adult  Goal: Free from fall injury  Outcome: Progressing     Problem: Discharge Planning  Goal: Discharge to home or other facility with appropriate resources  Outcome: Progressing     Problem: Chronic Conditions and Co-morbidities  Goal: Patient's chronic conditions and co-morbidity symptoms are monitored and maintained or improved  Outcome: Progressing     Problem: Diabetes  Goal: Achieve decreasing blood glucose levels by end of shift  Outcome: Progressing  Goal: Increase stability of blood glucose readings by end of shift  Outcome: Progressing  Goal: Decrease in ketones present in urine by end of shift  Outcome: Progressing  Goal: Maintain electrolyte levels within acceptable range throughout shift  Outcome: Progressing  Goal: Maintain glucose levels >70mg/dl to <250mg/dl throughout shift  Outcome: Progressing  Goal: No changes in neurological exam by end of shift  Outcome: Progressing  Goal: Learn about and adhere to nutrition recommendations by end of shift  Outcome: Progressing  Goal: Vital signs within normal range for age by end of shift  Outcome: Progressing  Goal: Increase self care and/or family involovement by end of shift  Outcome: Progressing  Goal: Receive DSME education by end of shift  Outcome: Progressing   The patient's goals for the shift include      The clinical goals for the shift include reduce swelling, fluid balance wdl

## 2024-02-29 VITALS
RESPIRATION RATE: 16 BRPM | BODY MASS INDEX: 26.82 KG/M2 | DIASTOLIC BLOOD PRESSURE: 73 MMHG | WEIGHT: 166.89 LBS | HEIGHT: 66 IN | HEART RATE: 59 BPM | OXYGEN SATURATION: 93 % | SYSTOLIC BLOOD PRESSURE: 122 MMHG | TEMPERATURE: 97.7 F

## 2024-02-29 LAB
ALBUMIN SERPL BCP-MCNC: 3.6 G/DL (ref 3.4–5)
ANION GAP SERPL CALC-SCNC: 11 MMOL/L (ref 10–20)
BUN SERPL-MCNC: 11 MG/DL (ref 6–23)
CALCIUM SERPL-MCNC: 9.2 MG/DL (ref 8.6–10.3)
CHLORIDE SERPL-SCNC: 99 MMOL/L (ref 98–107)
CO2 SERPL-SCNC: 32 MMOL/L (ref 21–32)
CREAT SERPL-MCNC: 0.57 MG/DL (ref 0.5–1.05)
EGFRCR SERPLBLD CKD-EPI 2021: 89 ML/MIN/1.73M*2
ERYTHROCYTE [DISTWIDTH] IN BLOOD BY AUTOMATED COUNT: 20.3 % (ref 11.5–14.5)
GLUCOSE SERPL-MCNC: 138 MG/DL (ref 74–99)
HCT VFR BLD AUTO: 35.2 % (ref 36–46)
HGB BLD-MCNC: 10.3 G/DL (ref 12–16)
INR PPP: 1.7 (ref 0.9–1.1)
MAGNESIUM SERPL-MCNC: 1.64 MG/DL (ref 1.6–2.4)
MCH RBC QN AUTO: 23.8 PG (ref 26–34)
MCHC RBC AUTO-ENTMCNC: 29.3 G/DL (ref 32–36)
MCV RBC AUTO: 81 FL (ref 80–100)
NRBC BLD-RTO: 0 /100 WBCS (ref 0–0)
PHOSPHATE SERPL-MCNC: 4.6 MG/DL (ref 2.5–4.9)
PLATELET # BLD AUTO: 396 X10*3/UL (ref 150–450)
POTASSIUM SERPL-SCNC: 3.5 MMOL/L (ref 3.5–5.3)
PROTHROMBIN TIME: 18.9 SECONDS (ref 9.8–12.8)
RBC # BLD AUTO: 4.33 X10*6/UL (ref 4–5.2)
SODIUM SERPL-SCNC: 138 MMOL/L (ref 136–145)
WBC # BLD AUTO: 7.4 X10*3/UL (ref 4.4–11.3)

## 2024-02-29 PROCEDURE — 99239 HOSP IP/OBS DSCHRG MGMT >30: CPT | Performed by: STUDENT IN AN ORGANIZED HEALTH CARE EDUCATION/TRAINING PROGRAM

## 2024-02-29 PROCEDURE — 2500000001 HC RX 250 WO HCPCS SELF ADMINISTERED DRUGS (ALT 637 FOR MEDICARE OP): Performed by: STUDENT IN AN ORGANIZED HEALTH CARE EDUCATION/TRAINING PROGRAM

## 2024-02-29 PROCEDURE — 94761 N-INVAS EAR/PLS OXIMETRY MLT: CPT | Performed by: STUDENT IN AN ORGANIZED HEALTH CARE EDUCATION/TRAINING PROGRAM

## 2024-02-29 PROCEDURE — 36415 COLL VENOUS BLD VENIPUNCTURE: CPT | Performed by: STUDENT IN AN ORGANIZED HEALTH CARE EDUCATION/TRAINING PROGRAM

## 2024-02-29 PROCEDURE — 80069 RENAL FUNCTION PANEL: CPT | Performed by: STUDENT IN AN ORGANIZED HEALTH CARE EDUCATION/TRAINING PROGRAM

## 2024-02-29 PROCEDURE — 83735 ASSAY OF MAGNESIUM: CPT | Performed by: STUDENT IN AN ORGANIZED HEALTH CARE EDUCATION/TRAINING PROGRAM

## 2024-02-29 PROCEDURE — 85027 COMPLETE CBC AUTOMATED: CPT | Performed by: STUDENT IN AN ORGANIZED HEALTH CARE EDUCATION/TRAINING PROGRAM

## 2024-02-29 PROCEDURE — 85610 PROTHROMBIN TIME: CPT | Performed by: STUDENT IN AN ORGANIZED HEALTH CARE EDUCATION/TRAINING PROGRAM

## 2024-02-29 PROCEDURE — 2500000004 HC RX 250 GENERAL PHARMACY W/ HCPCS (ALT 636 FOR OP/ED): Performed by: STUDENT IN AN ORGANIZED HEALTH CARE EDUCATION/TRAINING PROGRAM

## 2024-02-29 PROCEDURE — 2500000002 HC RX 250 W HCPCS SELF ADMINISTERED DRUGS (ALT 637 FOR MEDICARE OP, ALT 636 FOR OP/ED): Performed by: STUDENT IN AN ORGANIZED HEALTH CARE EDUCATION/TRAINING PROGRAM

## 2024-02-29 PROCEDURE — 94761 N-INVAS EAR/PLS OXIMETRY MLT: CPT

## 2024-02-29 PROCEDURE — 99232 SBSQ HOSP IP/OBS MODERATE 35: CPT

## 2024-02-29 RX ORDER — EZETIMIBE 10 MG/1
10 TABLET ORAL NIGHTLY
Qty: 30 TABLET | Refills: 2 | Status: SHIPPED | OUTPATIENT
Start: 2024-02-29 | End: 2024-02-29 | Stop reason: SDUPTHER

## 2024-02-29 RX ORDER — EZETIMIBE 10 MG/1
10 TABLET ORAL NIGHTLY
Qty: 30 TABLET | Refills: 0 | Status: SHIPPED | OUTPATIENT
Start: 2024-02-29 | End: 2024-03-30

## 2024-02-29 RX ORDER — SPIRONOLACTONE 25 MG/1
12.5 TABLET ORAL DAILY
Qty: 15 TABLET | Refills: 2 | Status: SHIPPED | OUTPATIENT
Start: 2024-02-29 | End: 2024-02-29 | Stop reason: SDUPTHER

## 2024-02-29 RX ORDER — GRANULES FOR ORAL 3 G/1
3 POWDER ORAL
Qty: 6 G | Refills: 0 | Status: SHIPPED | OUTPATIENT
Start: 2024-03-02 | End: 2024-02-29 | Stop reason: SDUPTHER

## 2024-02-29 RX ORDER — WARFARIN SODIUM 5 MG/1
5 TABLET ORAL DAILY
Status: DISCONTINUED | OUTPATIENT
Start: 2024-02-29 | End: 2024-02-29

## 2024-02-29 RX ORDER — SPIRONOLACTONE 25 MG/1
12.5 TABLET ORAL DAILY
Qty: 15 TABLET | Refills: 0 | Status: SHIPPED | OUTPATIENT
Start: 2024-02-29 | End: 2024-03-30

## 2024-02-29 RX ORDER — GRANULES FOR ORAL 3 G/1
3 POWDER ORAL ONCE
Status: COMPLETED | OUTPATIENT
Start: 2024-02-29 | End: 2024-02-29

## 2024-02-29 RX ORDER — GRANULES FOR ORAL 3 G/1
3 POWDER ORAL
Qty: 6 G | Refills: 0 | Status: SHIPPED | OUTPATIENT
Start: 2024-03-02 | End: 2024-03-06

## 2024-02-29 RX ADMIN — FUROSEMIDE 40 MG: 10 INJECTION, SOLUTION INTRAMUSCULAR; INTRAVENOUS at 04:17

## 2024-02-29 RX ADMIN — METOPROLOL TARTRATE 50 MG: 50 TABLET, FILM COATED ORAL at 08:45

## 2024-02-29 RX ADMIN — GABAPENTIN 300 MG: 300 CAPSULE ORAL at 08:45

## 2024-02-29 RX ADMIN — APIXABAN 5 MG: 5 TABLET, FILM COATED ORAL at 11:04

## 2024-02-29 RX ADMIN — NYSTATIN 1 APPLICATION: 100000 POWDER TOPICAL at 08:46

## 2024-02-29 RX ADMIN — Medication 400 MG: at 08:45

## 2024-02-29 RX ADMIN — GRANULES FOR ORAL SOLUTION 3 G: 3 POWDER ORAL at 11:05

## 2024-02-29 RX ADMIN — ESCITALOPRAM OXALATE 10 MG: 10 TABLET ORAL at 08:45

## 2024-02-29 RX ADMIN — PANTOPRAZOLE SODIUM 40 MG: 40 TABLET, DELAYED RELEASE ORAL at 08:45

## 2024-02-29 ASSESSMENT — COGNITIVE AND FUNCTIONAL STATUS - GENERAL
TURNING FROM BACK TO SIDE WHILE IN FLAT BAD: A LITTLE
STANDING UP FROM CHAIR USING ARMS: A LITTLE
MOBILITY SCORE: 18
DAILY ACTIVITIY SCORE: 19
HELP NEEDED FOR BATHING: A LITTLE
DRESSING REGULAR UPPER BODY CLOTHING: A LITTLE
CLIMB 3 TO 5 STEPS WITH RAILING: A LITTLE
PERSONAL GROOMING: A LITTLE
WALKING IN HOSPITAL ROOM: A LITTLE
TOILETING: A LITTLE
MOVING FROM LYING ON BACK TO SITTING ON SIDE OF FLAT BED WITH BEDRAILS: A LITTLE
DRESSING REGULAR LOWER BODY CLOTHING: A LITTLE
MOVING TO AND FROM BED TO CHAIR: A LITTLE

## 2024-02-29 ASSESSMENT — PAIN SCALES - GENERAL
PAINLEVEL_OUTOF10: 0 - NO PAIN

## 2024-02-29 ASSESSMENT — PAIN - FUNCTIONAL ASSESSMENT
PAIN_FUNCTIONAL_ASSESSMENT: 0-10

## 2024-02-29 NOTE — NURSING NOTE
Discharge Note: 2/29/2024 1405 Discharged via stretcher by [facility] to the [facility] at [facility], paperwork packet sent with transporter, personal belongings taken by transporter, no distress noted, no complaints voiced. Rubin SANTILLAN

## 2024-02-29 NOTE — PROGRESS NOTES
Pharmacy Consult for Warfarin (Coumadin) Management - Daily Progress Note   Patient on warfarin at facility for Afib/DVT.     Warfarin Dosing History  warfarin 5 mg daily at facility.     Labs  INR   Date Value Ref Range Status   02/29/2024 1.7 (H) 0.9 - 1.1 Final   02/28/2024 1.3 (H) 0.9 - 1.1 Final   02/27/2024 1.4 (H) 0.9 - 1.1 Final     Review  Warfarin Indication: Deep vein thrombosis  Target INR: 2 - 3    Patient received 7.5 mg on 2/27 and 2/28  Orders placed per pharmacy consult. Pharmacy will continue to monitor and adjust therapy as needed.   Order placed to resume home regimen of 5 mg daily with most recent INR result of 1.7.   Orders already placed for INR to be drawn with AM labs on 3/1/2024.   Ayaka Rashid, ChristenD

## 2024-02-29 NOTE — PROGRESS NOTES
Per medical team, patient is medically appropriate for discharge today.  met with patient at bedside to review discharge plan and Medicare IMM. Patient confirmed understanding and agreement with same. Plan for patient to return to the [facility] today with no Care Transitions needs foreseen. Care Transitions available upon request. BUDDY Ybarra     99

## 2024-02-29 NOTE — PROGRESS NOTES
"Subjective   Mayte Pulliam is a 88 y.o. female on day 2 of admission presenting with Heart failure due to valvular disease, acute, combined (CMS/HCC).    Patient reports feeling less short of breath and is happy about her leg swelling going down.    Overnight Events: None    Objective   Vital Signs:  Blood pressure (!) 154/91, pulse 57, temperature 36.6 °C (97.9 °F), temperature source Oral, resp. rate 18, height 1.664 m (5' 5.5\"), weight 75.7 kg (166 lb 14.2 oz), SpO2 (S) 92 %.    Physical Exam  Constitutional:       General: She is not in acute distress.     Appearance: Normal appearance. She is not ill-appearing.   HENT:      Head: Normocephalic and atraumatic.      Mouth/Throat:      Mouth: Mucous membranes are moist.   Eyes:      Extraocular Movements: Extraocular movements intact.      Conjunctiva/sclera: Conjunctivae normal.   Cardiovascular:      Rate and Rhythm: Normal rate and regular rhythm.      Pulses: Normal pulses.      Heart sounds: Normal heart sounds, S1 normal and S2 normal.   Pulmonary:      Effort: Pulmonary effort is normal.      Breath sounds: Normal breath sounds.   Abdominal:      General: Abdomen is flat. Bowel sounds are normal.      Palpations: Abdomen is soft.   Musculoskeletal:      Right lower le+Pitting Edema present.      Left lower le+Pitting Edema present.  Skin:     General: Skin is warm.      Capillary Refill: Capillary refill takes less than 2 seconds.      Findings: Erythema and wound present.      Comments: BLE erythema   Neurological:      General: No focal deficit present.      Mental Status: She is alert. Mental status is at baseline.   Psychiatric:         Mood and Affect: Mood normal.         Behavior: Behavior normal.         Thought Content: Thought content normal.         Judgment: Judgment normal.     Wt Readings from Last 6 Encounters:   24 75.7 kg (166 lb 14.2 oz)   24 72.6 kg (160 lb)   24 72.7 kg (160 lb 3.2 oz)   24 75.6 kg (166 " lb 10.7 oz)       I/Os    Intake/Output Summary (Last 24 hours) at 2/29/2024 0819  Last data filed at 2/29/2024 0503  Gross per 24 hour   Intake 748 ml   Output 1700 ml   Net -952 ml       Labs:   Results for orders placed or performed during the hospital encounter of 02/27/24 (from the past 24 hour(s))   CBC   Result Value Ref Range    WBC 7.4 4.4 - 11.3 x10*3/uL    nRBC 0.0 0.0 - 0.0 /100 WBCs    RBC 4.33 4.00 - 5.20 x10*6/uL    Hemoglobin 10.3 (L) 12.0 - 16.0 g/dL    Hematocrit 35.2 (L) 36.0 - 46.0 %    MCV 81 80 - 100 fL    MCH 23.8 (L) 26.0 - 34.0 pg    MCHC 29.3 (L) 32.0 - 36.0 g/dL    RDW 20.3 (H) 11.5 - 14.5 %    Platelets 396 150 - 450 x10*3/uL   Renal Function Panel   Result Value Ref Range    Glucose 138 (H) 74 - 99 mg/dL    Sodium 138 136 - 145 mmol/L    Potassium 3.5 3.5 - 5.3 mmol/L    Chloride 99 98 - 107 mmol/L    Bicarbonate 32 21 - 32 mmol/L    Anion Gap 11 10 - 20 mmol/L    Urea Nitrogen 11 6 - 23 mg/dL    Creatinine 0.57 0.50 - 1.05 mg/dL    eGFR 89 >60 mL/min/1.73m*2    Calcium 9.2 8.6 - 10.3 mg/dL    Phosphorus 4.6 2.5 - 4.9 mg/dL    Albumin 3.6 3.4 - 5.0 g/dL   Magnesium   Result Value Ref Range    Magnesium 1.64 1.60 - 2.40 mg/dL   Protime-INR   Result Value Ref Range    Protime 18.9 (H) 9.8 - 12.8 seconds    INR 1.7 (H) 0.9 - 1.1       Imaging:  No results found.    Medications:    Current Facility-Administered Medications:     acetaminophen (Tylenol) tablet 650 mg, 650 mg, oral, q4h PRN, Rodrick Jj DO, 650 mg at 02/28/24 2015    apixaban (Eliquis) tablet 5 mg, 5 mg, oral, q12h, Rodrick Jj DO    atorvastatin (Lipitor) tablet 20 mg, 20 mg, oral, Nightly, Rodrick Jj DO, 20 mg at 02/28/24 2015    ertapenem (INVanz) in sodium chloride 0.9 % 50 mL IV 1 g, 1 g, intravenous, q24h, Rodrick Jj DO, Stopped at 02/28/24 1633    escitalopram (Lexapro) tablet 10 mg, 10 mg, oral, q AM, Rodrick Jj DO, 10 mg at 02/28/24 0914    ezetimibe (Zetia) tablet 10 mg, 10 mg, oral, Nightly,  Shira Ramirez, APRN-CNP, DNP, 10 mg at 02/28/24 2016    furosemide (Lasix) injection 40 mg, 40 mg, intravenous, q12h, Rodrick Jj DO, 40 mg at 02/29/24 0417    [Held by provider] furosemide (Lasix) tablet 40 mg, 40 mg, oral, Daily, Rodrick Jj DO    gabapentin (Neurontin) capsule 300 mg, 300 mg, oral, q AM, Rodrick Jj DO, 300 mg at 02/28/24 0914    gabapentin (Neurontin) capsule 600 mg, 600 mg, oral, Nightly, Rodrick Jj DO, 600 mg at 02/28/24 2016    magnesium oxide (Mag-Ox) tablet 400 mg, 400 mg, oral, Daily, Rodrick Jj DO, 400 mg at 02/28/24 0916    melatonin tablet 3 mg, 3 mg, oral, Daily, Rodrick Jj DO, 3 mg at 02/28/24 2016    metoprolol tartrate (Lopressor) tablet 50 mg, 50 mg, oral, BID, Rodrick Jj DO, 50 mg at 02/28/24 2016    mirtazapine (Remeron) tablet 15 mg, 15 mg, oral, Nightly, Rodrick Jj DO, 15 mg at 02/28/24 2015    nystatin (Mycostatin) 100,000 unit/gram powder 1 Application, 1 Application, Topical, BID, Rodrick Jj DO, 1 Application at 02/28/24 2015    ondansetron ODT (Zofran-ODT) disintegrating tablet 4 mg, 4 mg, oral, q8h PRN, Rodrick Jj DO    oxygen (O2) therapy, , inhalation, Continuous PRN - O2/gases, Rodrick Jj DO, 1 L/min at 02/29/24 0749    pantoprazole (ProtoNix) EC tablet 40 mg, 40 mg, oral, Daily, Rodrick Jj DO, 40 mg at 02/28/24 0914    Assessment & Plan    Mayte Pulliam is a 88 y.o. female on day 1 of admission to [facility] for management of complicated UTI, acute decompensated diastolic heart failure c/b volume overload, and venous stasis dermatitis.     Acute on chronic HFpEF  - Most recent TTE reveals EF 60-65% & moderate aortic stenosis  - She has diuresed well and is approaching euvolemia. She is down 2.5 kg from admission  - Continue IV Lasix 40 mg BID, strict I/Os, daily weight, Cardiac Diet, fluid restriction per cardiology team  - Continue metoprolol tartrate 50 mg BID  - Once euvolemic will add GDMT      Subtherapeutic INR, Hx of DVT  - INR continues to remain subtherapeutic despite dose increase  - On review of her outside medical records she was actually on Eliquis up until last month. Cost of prescription appears to be an issue  - Will consult [facility] for Eliquis cost assistance     ESBL UTI, asymptomatic  Venous stasis dermatitis  Candidal Intertrigo  - Continue IV Ertapenem based on previous cultures. PO Fosfomycin on discharge  - Continue lymphedema compression wrapping for BLE  - Start Nystatin powder for groin folds     Disposition: Anticipate discharge home tomorrow    Moderate level of MDM based on above medical conditions and discussion of plan.    Rodrick Jj, DO  Internal Medicine

## 2024-02-29 NOTE — CARE PLAN
Problem: Pain - Adult  Goal: Verbalizes/displays adequate comfort level or baseline comfort level  Outcome: Progressing     Problem: Safety - Adult  Goal: Free from fall injury  Outcome: Progressing     Problem: Discharge Planning  Goal: Discharge to home or other facility with appropriate resources  Outcome: Progressing     Problem: Chronic Conditions and Co-morbidities  Goal: Patient's chronic conditions and co-morbidity symptoms are monitored and maintained or improved  Outcome: Progressing     Problem: Diabetes  Goal: Achieve decreasing blood glucose levels by end of shift  Outcome: Progressing  Goal: Increase stability of blood glucose readings by end of shift  Outcome: Progressing  Goal: Decrease in ketones present in urine by end of shift  Outcome: Progressing  Goal: Maintain electrolyte levels within acceptable range throughout shift  Outcome: Progressing  Goal: Maintain glucose levels >70mg/dl to <250mg/dl throughout shift  Outcome: Progressing  Goal: No changes in neurological exam by end of shift  Outcome: Progressing  Goal: Learn about and adhere to nutrition recommendations by end of shift  Outcome: Progressing  Goal: Vital signs within normal range for age by end of shift  Outcome: Progressing  Goal: Increase self care and/or family involovement by end of shift  Outcome: Progressing  Goal: Receive DSME education by end of shift  Outcome: Progressing   The patient's goals for the shift include      The clinical goals for the shift include decreased swelling

## 2024-02-29 NOTE — DISCHARGE SUMMARY
Discharge Diagnosis  Heart failure due to valvular disease, acute, combined (CMS/[facility])  ESBL UTI 2/2 E.coli & Klebsiella    Issues Requiring Follow-Up  Cardiology follow-up 2/2 HFpEF: Will need [facility] enrollment for cost assistance and compliance    Test Results Pending At Discharge  Pending Labs       Order Current Status    Extra Urine Gray Tube Collected (02/27/24 1800)    Urinalysis with Reflex Culture and Microscopic In process    Blood Culture Preliminary result            Hospital Course  Mayte Pulliam is a 88 y.o. female who  was admitted to [facility] for management of complicated UTI, acute decompensated diastolic heart failure c/b volume overload, and venous stasis dermatitis.     Acute on chronic HFpEF  - Most recent TTE reveals EF 60-65% & moderate aortic stenosis  - She has diuresed well and is approaching euvolemia. She is down 2.5 kg from admission  - Continue IV Lasix 40 mg BID, strict I/Os, daily weight, Cardiac Diet, fluid restriction per cardiology team  - Continue metoprolol tartrate 50 mg BID  - Once euvolemic will add GDMT     Subtherapeutic INR, Hx of DVT  - INR continues to remain subtherapeutic despite dose increase  - On review of her outside medical records she was actually on Eliquis up until last month. Cost of prescription appears to be an issue  - Will consult [facility] for Eliquis cost assistance     ESBL UTI, asymptomatic  Venous stasis dermatitis  Candidal Intertrigo  - Continue IV Ertapenem based on previous cultures. PO Fosfomycin on discharge  - Continue lymphedema compression wrapping for BLE  - Start Nystatin powder for groin folds    2/29/2024:   - Patient is eucolemic and medically optimized for discharge home. She was started on spironolactone 12.5 mg in addition to recent Lasix 40 mg. She will benefit from heart failure GDMT such as Jardiance/Farxiga (whichever insurance approves), and Entresto if BP tolerates.     - Patient will need close cardiology follow-up and will  have [facility] HFMS placed. Due to sub-therapeutic INR while on Warfarin, patient was started on Eliquis (she was previously on it up until January 2024, but due to cost was placed on warfarin?).     - She will need to complete 2 additional doses of PO Fosfomycin 3g for treatment of asymptomatic ESBL UTI. She is likely colonized.    - Referral for [facility] was placed for medication cost assistance and ensure better compliance to her heart failure regimen.    More than 35 minutes were spent in coordinating patient discharge.       Pertinent Physical Exam At Time of Discharge  Physical Exam  Constitutional:       General: She is not in acute distress.     Appearance: Normal appearance. She is not ill-appearing.   HENT:      Head: Normocephalic and atraumatic.      Mouth/Throat:      Mouth: Mucous membranes are moist.   Eyes:      Extraocular Movements: Extraocular movements intact.      Conjunctiva/sclera: Conjunctivae normal.   Cardiovascular:      Rate and Rhythm: Normal rate and regular rhythm.      Pulses: Normal pulses.      Heart sounds: Normal heart sounds, S1 normal and S2 normal.   Pulmonary:      Effort: Pulmonary effort is normal.      Breath sounds: Normal breath sounds.   Abdominal:      General: Abdomen is flat. Bowel sounds are normal.      Palpations: Abdomen is soft.   Musculoskeletal:      Right lower leg: Trace pitting edema present.      Left lower leg: Trace pitting edema present.   Skin:     General: Skin is warm.      Capillary Refill: Capillary refill takes less than 2 seconds.      Findings: Erythema and wound present.      Comments: BLE erythema   Neurological:      General: No focal deficit present.      Mental Status: She is alert. Mental status is at baseline.   Psychiatric:         Mood and Affect: Mood normal.         Behavior: Behavior normal.         Thought Content: Thought content normal.         Judgment: Judgment normal.     Home Medications     Medication List      START taking these  medications     apixaban 5 mg tablet; Commonly known as: Eliquis; Take 1 tablet (5 mg)   by mouth 2 times a day.   empagliflozin 10 mg; Commonly known as: Jardiance; Take 1 tablet (10 mg)   by mouth once daily.   ezetimibe 10 mg tablet; Commonly known as: Zetia; Take 1 tablet (10 mg)   by mouth once daily at bedtime.   fosfomycin 3 gram packet; Commonly known as: Monurol; Take 3 g by mouth   every 3 days for 6 days. Do not start before March 2, 2024.; Start taking   on: March 2, 2024   spironolactone 25 mg tablet; Commonly known as: Aldactone; Take 0.5   tablets (12.5 mg) by mouth once daily.     CONTINUE taking these medications     * acetaminophen 325 mg tablet; Commonly known as: Tylenol   * acetaminophen 325 mg tablet; Commonly known as: Tylenol   atorvastatin 20 mg tablet; Commonly known as: Lipitor   Biofreeze (menthol) 4 % gel gel; Generic drug: menthol   cyclobenzaprine 10 mg tablet; Commonly known as: Flexeril   escitalopram 10 mg tablet; Commonly known as: Lexapro   furosemide 40 mg tablet; Commonly known as: Lasix; Take 1 tablet (40 mg)   by mouth once daily.   * gabapentin 300 mg capsule; Commonly known as: Neurontin   * gabapentin 300 mg capsule; Commonly known as: Neurontin   loperamide 2 mg capsule; Commonly known as: Imodium   metFORMIN  mg 24 hr tablet; Commonly known as: Glucophage-XR   metoprolol tartrate 50 mg tablet; Commonly known as: Lopressor   mirtazapine 15 mg tablet; Commonly known as: Remeron   multivitamin tablet   mupirocin 2 % ointment; Commonly known as: Bactroban   ondansetron 4 mg tablet; Commonly known as: Zofran   pantoprazole 40 mg EC tablet; Commonly known as: ProtoNix  * This list has 4 medication(s) that are the same as other medications   prescribed for you. Read the directions carefully, and ask your doctor or   other care provider to review them with you.     STOP taking these medications     cephalexin 500 mg capsule; Commonly known as: Keflex   magnesium oxide 400  mg tablet; Commonly known as: Mag-Ox   warfarin 5 mg tablet; Commonly known as: Coumadin       Outpatient Follow-Up  Future Appointments   Date Time Provider Department Center   3/12/2024  9:00 AM TICO ECHO 1 [id number] TICO Jj DO

## 2024-02-29 NOTE — NURSING NOTE
Called [facility] and left vmail for a call back. Need to give report on the patient.

## 2024-02-29 NOTE — PROGRESS NOTES
Subjective Data:  Patient examined at bedside and swelling has improved; current weight is 166lbs. Patient agreeable to following up in the HF clinic after discharge.       Objective Data:  Last Recorded Vitals:  Vitals:    02/29/24 0714 02/29/24 0729 02/29/24 0733 02/29/24 0749   BP:       BP Location:       Patient Position:       Pulse:       Resp:  18     Temp:       TempSrc:       SpO2: (S) 94%  (S) (!) 86% (S) 92%   Weight:       Height:           Last Labs:  CBC - 2/29/2024:  5:03 AM  7.4 10.3 396    35.2      CMP - 2/29/2024:  5:03 AM  9.2 6.3 12 --- 0.2   4.6 3.6 10 99      PTT - 2/28/2024:  4:22 AM  1.7   18.9 29     TROPHS   Date/Time Value Ref Range Status   02/02/2024 05:13 PM 4 0 - 13 ng/L Final     BNP   Date/Time Value Ref Range Status   02/27/2024 01:00  0 - 99 pg/mL Final   02/22/2024 10:59  0 - 99 pg/mL Final      Last I/O:  I/O last 3 completed shifts:  In: 868 (11.5 mL/kg) [P.O.:768; IV Piggyback:100]  Out: 3000 (39.6 mL/kg) [Urine:3000 (1.1 mL/kg/hr)]  Weight: 75.7 kg     Past Cardiology Tests (Last 3 Years):  EKG:  ECG 12 lead 02/22/2024      ECG 12 lead 02/02/2024    Echo:  Transthoracic Echo (TTE) Complete 02/22/2024    Ejection Fractions:  EF   Date/Time Value Ref Range Status   02/22/2024 02:49 PM 58 %      Cath:  No results found for this or any previous visit from the past 1095 days.    Stress Test:  No results found for this or any previous visit from the past 1095 days.    Cardiac Imaging:  No results found for this or any previous visit from the past 1095 days.      Inpatient Medications:  Scheduled medications   Medication Dose Route Frequency    apixaban  5 mg oral q12h    atorvastatin  20 mg oral Nightly    ertapenem  1 g intravenous q24h    escitalopram  10 mg oral q AM    ezetimibe  10 mg oral Nightly    fosfomycin  3 g oral Once    furosemide  40 mg intravenous q12h    [Held by provider] furosemide  40 mg oral Daily    gabapentin  300 mg oral q AM    gabapentin  600  mg oral Nightly    magnesium oxide  400 mg oral Daily    melatonin  3 mg oral Daily    metoprolol tartrate  50 mg oral BID    mirtazapine  15 mg oral Nightly    nystatin  1 Application Topical BID    pantoprazole  40 mg oral Daily     PRN medications   Medication    acetaminophen    ondansetron ODT    oxygen     Continuous Medications   Medication Dose Last Rate       Physical Exam:    General: awake, alert and oriented. No acute distress.   Skin: Skin is warm, dry and intact without rashes or lesions.   HEENT: normocephalic, atraumatic; conjunctivae are clear without exudates or hemorrhage. Sclera is non-icteric. Eyelids are normal in appearance without swelling or lesions. Hearing intact. Nares are patent bilaterally. Moist mucous membranes.   Cardiovascular: heart rate and rhythm are normal. Grade 4/6 systolic murmur  Respiratory: bilateral lung sounds clear to auscultations without rales, rhonchi, or wheezes. No accessory muscle use or stridor  Gastrointestinal: non-distended, non-tender  Genitourinary: exam deferred  Musculoskeletal: no deformities  Extremities: pulses palpable bilaterally;wraps in place; edema has improved; +1 remains in lower extremities and upper legs  Neurological: no focal deficits  Psychiatric: appropriate mood and affect; good judgment and insight           Assessment/Plan   ACC/AHA Stage B HFpEF:  -NYHA Class II  -Swelling is improved, but remains; continue IV lasix 40mg twice daily  -Continue daily weights; weight has come down and currently at 166 lbs  -Direct LDL (April, 2023) 119; will add zetia 10mg to her current regimen   -HbA1c (April, 2023) 6.9%; recommend starting SGLT2i at discharge; please consult with pharmacy on which SGLT2i is preferred based on patient's insurance   -Recommend patient establish care in our office for outpatient follow up; patient referred to HF clinic and is agreeable   -Will order HFMS at discharge       Aortic stenosis, moderate:  -Echo showing  moderate aortic valve stenosis with low-moderate gradients  -Continue medications as prescribed  -Recommend patient establish care in our office for outpatient follow up   Peripheral IV 02/27/24 22 G Right;Ventral Forearm (Active)   Site Assessment Clean;Dry;Intact 02/29/24 0910   Dressing Type Transparent 02/29/24 0910   Line Status Saline locked 02/29/24 0910   Dressing Status Clean;Dry 02/29/24 0910   Number of days: 2       Code Status:  Full Code    Thank you for allowing me to participate in the care of this patient. Please reach me out if you have any questions or if you need any clarifications regarding the patient's care.          Shira Ramirez, APRN-CNP, DNP

## 2024-02-29 NOTE — DISCHARGE INSTR - OTHER ORDERS
"Swelling    The Basics  Written by the doctors and editors at [facility]  What is swelling? -- Swelling happens when fluid collects in small spaces around tissues and organs inside the body. Another word for swelling is \"edema.\" Some common parts of the body where people can have swelling are the:  ? Lower legs or hands  ? Belly  ? Chest - Swelling can occur in the lungs or in the space around the lungs.  Swelling in the legs, hands, and belly can be uncomfortable and can be a symptom of a more serious condition. Swelling in the lungs can be life-threatening, because it is usually a symptom of a serious heart problem.  What are the symptoms of swelling? -- Symptoms of swelling can include:  ? Puffiness of the skin, which can cause the skin to look stretched and shiny - This often occurs with swelling in the lower legs or lower back, and can be worse after people sit or stand for a long time (figure 1).  ? Increase in belly size (with swelling of the belly)  ? Trouble breathing (with swelling in the chest)  What are the causes of swelling? -- Different conditions can cause swelling. Some of these include:  ? Problems with veins (blood vessels) in the legs - Normally, veins carry blood from the body back to the heart. But if valves in the veins do not work well, the veins cannot pump enough blood back to the heart. This can cause swelling in the lower legs.  ? Blood clots - People who have a blood clot blocking a leg vein can have swelling in the feet or ankles.  ? Pregnancy - Pregnant people can have swelling in the hands, feet, or face.  ? Monthly periods - People can have swelling in different parts of their body before they get their period.  ? Medicines - Swelling can be a side effect of some medicines, such as medicines for diabetes, high blood pressure, or pain.  ? Kidney problems - People who have certain kidney problems can have swelling in the lower legs or around the eyes.  ? Heart failure - Heart failure is " "a type of heart problem in which the heart cannot pump normally. People with heart failure can have swelling in the legs, belly, or lungs.  ? Liver problems - People who have certain liver problems can have swelling in the belly or lower legs.  ? Travel - People who sit for a long time when traveling can have swelling in the lower legs.  When should I call my doctor or nurse? -- Call your doctor or nurse if you have new swelling:  ? In 1 or both of your legs  ? In your hands  ? In your belly  ? Around your eyes  You should also call your doctor or nurse if you travel and sit for a long time, and then have leg pain or swelling that does not go away after a few days.  How is swelling treated? -- Doctors can treat swelling in different ways, depending on the cause. Treatment can include 1 or more of the following:  ? Treatment for the medical condition that is causing the swelling  ? Diet changes to reduce the amount of salt in the food that you eat  ? Medicines to help your body get rid of extra fluid  ? Special socks called \"compression stockings\" - These fit tightly over the ankle and leg, and can reduce leg swelling. If your doctor or nurse recommends that you wear them, they will tell you which type to wear and how to put them on (figure 2 and figure 3 and table 1).  ? Raising the legs up - Some people can reduce swelling in the legs, ankles, and feet by raising their legs up 3 or 4 times a day for 30 minutes each time. The legs need to be raised above the level of the heart.  Not all types of swelling need treatment. For example, swelling that occurs during pregnancy or before monthly periods usually does not need treatment.  How can I help prevent leg swelling when I travel on long flights? -- To help prevent leg swelling on flights that are longer than 6 to 8 hours:  ? Stand up and walk around every hour or 2.  ? Do not smoke before traveling.  ? Wear loose-fitting and comfortable clothes.  ? Ask if you can sit " in the bulkhead or emergency exit row.  ? Point and flex your feet, and bend your knees from time to time.  ? Drink plenty of fluids, and do not drink alcohol.  ? Do not take medicines such as sleeping pills that can prevent you from getting up and moving around.  All topics are updated as new evidence becomes available and our peer review process is complete.  This topic retrieved from [employer] on: Feb 26, 2024.  Topic 74739 Version 11.0  Release: 32.2.4 - C32.56  © 2024 UpToDate, Inc. and/or its affiliates. All rights reserved.

## 2024-02-29 NOTE — NURSING NOTE
Discharge Note: 2/29/2024 1219 AVS faxed to the [facility] at [facility] [phone], [phone]. Rubin SANTILLAN

## 2024-02-29 NOTE — SIGNIFICANT EVENT
This is the second night in a row that pt De-sats while asleep. Pt would benefit from an overnight sleep study Pt  SpO2 86% on 1L NC increased to 2L post SpO2 92%.

## 2024-02-29 NOTE — PROGRESS NOTES
"Music Therapy Note    Mayte Pulliam was referred by Velma Bacon, TYRELL.     Therapy Session  Referral Type: New referral this admission  Visit Type: New visit  Session Start Time: 1356  Session End Time: 1358  Intervention Delivery: In-person  Conflict of Service: None  Family Present for Session: None     Pre-assessment  Unable to Assess Reason: Outcomes not assessed  Mood/Affect: Calm, Appropriate         Treatment/Interventions  Music Therapy Interventions: Assessment  Patient Fell Asleep at End of Session: No    Post-assessment  Unable to Assess Reason: Did not provide expressive therapy intervention  Mood/Affect: Calm, Appropriate  Total Session Time (min): 2 minutes    Narrative  Assessment Detail: Pt fpund resting on bed, awake/alert, upon arrival of music therapist. Pt expressed doing okay today, no complaints. Pt receptive to music therapy education and rack card resource. Pt stating \"I'll think about it\" and open to follow-up.  Follow-up: MT will follow-up as applicable.    Education Documentation  Integrative Health, taught by LISSETTE Wang at 2/29/2024 12:16 PM.  Learner: Patient  Readiness: Acceptance  Method: Explanation, Handout  Response: Verbalizes Understanding            "

## 2024-02-29 NOTE — PROGRESS NOTES
Medication Education     Medication education for Mayte Pulliam was provided to the  Erin (POA)  for the following medication(s):  Eilquis, Jardiance, Zetia, Fosfomycin, Spironolactone      Medication education provided by a Pharmacist:  ADR Counseling Medication interactions Dose, frequency, storage How to take and what to do if a dose is missed Proper dose, indication, possible ADRs Refilling the medication  How the medication works and benefits of taking it Benefits of taking the medication  Importance of compliance Necessary labs and/or other monitoring Any drug interactions (including OTCs and herbvals) and importance of notifying a healthcare provider of any medication changes Potential duration of therapy Proper storage of the medication(s)    Identified potential barriers to education:  None    Method(s) of Education:  Verbal    An opportunity to ask questions and receive answers was provided.     Assessment of understanding the  Erin (POA) :  2= meets goals/outcomes    Additional Notes (if applicable): reviewed all meds with POA.  There were concerns about cost.  This was a time intensive process as [facility] initially received all home going scripts but they should have went to [facility].  Provider was contacted to change pharmacy - he waited for a while to do that.  I was sent screen shots of insurance card which was already tried.  Our  informed me the patient has no prescription coverage.  I then went to the patient's room and spoke to the patient about this and she was unaware of not having coverage.  I then called [facility] in [place] and spoke with the billing department there who informed me that they had not received payment from the patient and a bill was mailed out.  They said soon this bill if not paid would go to [facility].  I then called and spoke with the POA (Erin) on the phone I found out the patient was sent bills from both [facility] and [facility] for  meds administered at [place].  Apparently they were unpaid.  Discharging provider wants [employer] to pay for expensive meds (Eliquis / Jardiance) but at this point I could not confirm she has any prescription coverage.  TANI (Erin) stated she will look into this matter further (no Rx coverage).  Time spent - approx 2 hours.    Ezequiel Samson, [facility]

## 2024-02-29 NOTE — NURSING NOTE
1127  - SpO2 93 Room Air At rest, Hr 62  1129 - SpO2 92 room air walking  1134 - SpO2 95 room air walking  1135 - SpO2 95 room air walking, Hr 75  1136 - SpO2 96 room air resting, Hr 76  No oxygen required     Results given to charge nurse Matilde Bedolla, RRT  3:38 PM

## 2024-03-01 ENCOUNTER — PATIENT OUTREACH (OUTPATIENT)
Dept: HOME HEALTH SERVICES | Age: 89
End: 2024-03-01
Payer: MEDICARE

## 2024-03-02 LAB — BACTERIA BLD CULT: NORMAL

## 2024-03-12 ENCOUNTER — APPOINTMENT (OUTPATIENT)
Dept: CARDIOLOGY | Facility: HOSPITAL | Age: 89
End: 2024-03-12
Payer: MEDICARE

## 2024-03-22 NOTE — PROCEDURE: EXCISION
177.8 Slit Excision Additional Text (Leave Blank If You Do Not Want): A linear line was drawn on the skin overlying the lesion. An incision was made slowly until the lesion was visualized.  Once visualized, the lesion was removed with blunt dissection.

## 2024-07-04 PROCEDURE — 87086 URINE CULTURE/COLONY COUNT: CPT | Mod: OUT,SAMLAB | Performed by: INTERNAL MEDICINE

## 2024-07-04 PROCEDURE — 81003 URINALYSIS AUTO W/O SCOPE: CPT | Mod: OUT | Performed by: INTERNAL MEDICINE

## 2024-07-05 ENCOUNTER — LAB REQUISITION (OUTPATIENT)
Dept: LAB | Facility: HOSPITAL | Age: 89
End: 2024-07-05
Payer: MEDICARE

## 2024-07-05 DIAGNOSIS — M54.50 LOW BACK PAIN, UNSPECIFIED: ICD-10-CM

## 2024-07-05 LAB
APPEARANCE UR: ABNORMAL
BACTERIA #/AREA URNS AUTO: ABNORMAL /HPF
BILIRUB UR STRIP.AUTO-MCNC: NEGATIVE MG/DL
COLOR UR: ABNORMAL
GLUCOSE UR STRIP.AUTO-MCNC: NORMAL MG/DL
KETONES UR STRIP.AUTO-MCNC: NEGATIVE MG/DL
LEUKOCYTE ESTERASE UR QL STRIP.AUTO: ABNORMAL
NITRITE UR QL STRIP.AUTO: ABNORMAL
PH UR STRIP.AUTO: 6 [PH]
PROT UR STRIP.AUTO-MCNC: ABNORMAL MG/DL
RBC # UR STRIP.AUTO: ABNORMAL /UL
RBC #/AREA URNS AUTO: ABNORMAL /HPF
SP GR UR STRIP.AUTO: 1.01
SQUAMOUS #/AREA URNS AUTO: ABNORMAL /HPF
TRANS CELLS #/AREA UR COMP ASSIST: ABNORMAL /HPF
UROBILINOGEN UR STRIP.AUTO-MCNC: NORMAL MG/DL
WBC #/AREA URNS AUTO: >50 /HPF
WBC CLUMPS #/AREA URNS AUTO: ABNORMAL /HPF

## 2024-07-06 LAB — HOLD SPECIMEN: NORMAL

## 2024-07-07 LAB — BACTERIA UR CULT: ABNORMAL

## 2024-07-08 LAB — BACTERIA UR CULT: ABNORMAL
